# Patient Record
Sex: MALE | Race: WHITE | NOT HISPANIC OR LATINO | Employment: FULL TIME | URBAN - NONMETROPOLITAN AREA
[De-identification: names, ages, dates, MRNs, and addresses within clinical notes are randomized per-mention and may not be internally consistent; named-entity substitution may affect disease eponyms.]

---

## 2017-06-08 ENCOUNTER — TELEPHONE (OUTPATIENT)
Dept: FAMILY MEDICINE CLINIC | Facility: CLINIC | Age: 62
End: 2017-06-08

## 2017-06-13 ENCOUNTER — OFFICE VISIT (OUTPATIENT)
Dept: FAMILY MEDICINE CLINIC | Facility: CLINIC | Age: 62
End: 2017-06-13

## 2017-06-13 VITALS
WEIGHT: 195 LBS | DIASTOLIC BLOOD PRESSURE: 70 MMHG | HEIGHT: 72 IN | SYSTOLIC BLOOD PRESSURE: 120 MMHG | BODY MASS INDEX: 26.41 KG/M2

## 2017-06-13 DIAGNOSIS — I10 ESSENTIAL HYPERTENSION: Primary | Chronic | ICD-10-CM

## 2017-06-13 DIAGNOSIS — E78.01 FAMILIAL HYPERCHOLESTEROLEMIA: Chronic | ICD-10-CM

## 2017-06-13 DIAGNOSIS — Z23 NEED FOR VACCINATION: ICD-10-CM

## 2017-06-13 PROCEDURE — 90471 IMMUNIZATION ADMIN: CPT | Performed by: FAMILY MEDICINE

## 2017-06-13 PROCEDURE — 90715 TDAP VACCINE 7 YRS/> IM: CPT | Performed by: FAMILY MEDICINE

## 2017-06-13 PROCEDURE — 99213 OFFICE O/P EST LOW 20 MIN: CPT | Performed by: FAMILY MEDICINE

## 2017-06-13 NOTE — PROGRESS NOTES
Subjective   Benito Bryson is a 62 y.o. male.     History of Present Illness reevaluation hypertension hyperlipidemia.  In interim as lost 10 pounds.  Remaining active.  His time for tetanus update.  Is up-to-date on all other.   knee for flu vaccines in the fall.  Also counseled summer hydration as well as trauma prevention and sun exposure.    The following portions of the patient's history were reviewed and updated as appropriate: allergies, current medications, past family history, past medical history, past social history, past surgical history and problem list.    Review of Systems   Constitutional: Negative for activity change, appetite change, fatigue and unexpected weight change.   HENT: Negative for trouble swallowing and voice change.    Eyes: Negative for redness and visual disturbance.   Respiratory: Negative for cough and wheezing.    Cardiovascular: Negative for chest pain and palpitations.   Gastrointestinal: Negative for abdominal pain, constipation, diarrhea, nausea and vomiting.   Genitourinary: Negative for urgency.   Musculoskeletal: Negative for joint swelling.   Neurological: Negative for syncope and headaches.   Hematological: Negative for adenopathy.   Psychiatric/Behavioral: Negative for sleep disturbance.       Objective   Physical Exam   Constitutional: He is oriented to person, place, and time. He appears well-developed.   HENT:   Head: Normocephalic.   Nose: Nose normal.   Eyes: Pupils are equal, round, and reactive to light.   Neck: Normal range of motion. No thyromegaly present.   Cardiovascular: Normal rate, regular rhythm, normal heart sounds and intact distal pulses.  Exam reveals no gallop and no friction rub.    No murmur heard.  Pulmonary/Chest: Breath sounds normal.   Abdominal: Soft. He exhibits no distension and no mass. There is no tenderness.   Musculoskeletal: Normal range of motion.   Neurological: He is alert and oriented to person, place, and time. He has  normal reflexes.   Skin: Skin is warm and dry.   Over tanned counseled on same   Psychiatric: He has a normal mood and affect.       Assessment/Plan   Problems Addressed this Visit        Cardiovascular and Mediastinum    Essential hypertension - Primary (Chronic)    Relevant Orders    Comprehensive Metabolic Panel    Magnesium    Hyperlipidemia (Chronic)    Relevant Orders    LDL Cholesterol, Direct      Other Visit Diagnoses     Need for vaccination        Relevant Orders    Tdap Vaccine Greater Than or Equal To 8yo IM           on above.  Lab work next time.   other healthcare issues.  Recheck 6 months.

## 2017-08-09 ENCOUNTER — OFFICE VISIT (OUTPATIENT)
Dept: FAMILY MEDICINE CLINIC | Facility: CLINIC | Age: 62
End: 2017-08-09

## 2017-08-09 VITALS
DIASTOLIC BLOOD PRESSURE: 80 MMHG | SYSTOLIC BLOOD PRESSURE: 118 MMHG | WEIGHT: 195.3 LBS | BODY MASS INDEX: 26.45 KG/M2 | HEIGHT: 72 IN | TEMPERATURE: 100 F

## 2017-08-09 DIAGNOSIS — R19.7 NAUSEA VOMITING AND DIARRHEA: Primary | ICD-10-CM

## 2017-08-09 DIAGNOSIS — R50.9 FEVER, UNSPECIFIED FEVER CAUSE: ICD-10-CM

## 2017-08-09 DIAGNOSIS — R11.2 NAUSEA VOMITING AND DIARRHEA: Primary | ICD-10-CM

## 2017-08-09 PROCEDURE — 99214 OFFICE O/P EST MOD 30 MIN: CPT | Performed by: FAMILY MEDICINE

## 2017-08-09 RX ORDER — PROMETHAZINE HYDROCHLORIDE 25 MG/1
TABLET ORAL
Qty: 15 TABLET | Refills: 1 | Status: SHIPPED | OUTPATIENT
Start: 2017-08-09 | End: 2017-12-21

## 2017-08-09 NOTE — PROGRESS NOTES
Subjective   Benito Bryson is a 62 y.o. male.     History of Present Illness chief complaint of nausea vomiting diarrhea times one started yesterday.  Fever low-grade to the night.  No symptoms other than just mild body aches and queasiness today.  No history of exposure no new foods or tick bites etc.  this had no abdominal surgeries in the past.  current medicines remain the same.    The following portions of the patient's history were reviewed and updated as appropriate: allergies, current medications, past family history, past medical history, past social history, past surgical history and problem list.    Review of Systems   Constitutional: Positive for fever. Negative for activity change, appetite change, fatigue and unexpected weight change.   HENT: Negative for trouble swallowing and voice change.    Eyes: Negative for redness and visual disturbance.   Respiratory: Negative for cough and wheezing.    Cardiovascular: Negative for chest pain and palpitations.   Gastrointestinal: Positive for diarrhea and nausea. Negative for abdominal pain, constipation and vomiting.   Genitourinary: Negative for urgency.   Musculoskeletal: Negative for joint swelling.   Neurological: Negative for syncope and headaches.   Hematological: Negative for adenopathy.   Psychiatric/Behavioral: Negative for sleep disturbance.       Objective   Physical Exam   Constitutional: He appears well-developed.   HENT:   Head: Normocephalic.   Mouth/Throat: Oropharynx is clear and moist.   Eyes: Pupils are equal, round, and reactive to light.   Neck: Normal range of motion. Neck supple.   Cardiovascular: Normal rate, regular rhythm, normal heart sounds and intact distal pulses.    Pulmonary/Chest: Effort normal and breath sounds normal.   Abdominal: Soft. Bowel sounds are normal. He exhibits no distension and no mass. There is no tenderness. There is no rebound and no guarding. No hernia.   Musculoskeletal: Normal range of motion.    Neurological: He is alert.   Skin: Skin is warm and dry. No rash noted.   Psychiatric: He has a normal mood and affect. His behavior is normal.       Assessment/Plan   Problems Addressed this Visit     None      Visit Diagnoses     Nausea vomiting and diarrhea    -  Primary    Relevant Medications    promethazine (PHENERGAN) 25 MG tablet    Fever, unspecified fever cause        Relevant Medications    promethazine (PHENERGAN) 25 MG tablet          Enteritis by history.   early to declare.  Counseled on hydration exposure medication symptomatic relief.   not improved in 12-18 hours is to let us know.  Counseled on disease process.

## 2017-12-17 DIAGNOSIS — I10 ESSENTIAL HYPERTENSION: ICD-10-CM

## 2017-12-18 RX ORDER — LISINOPRIL 10 MG/1
TABLET ORAL
Qty: 90 TABLET | Refills: 3 | Status: SHIPPED | OUTPATIENT
Start: 2017-12-18 | End: 2018-12-17 | Stop reason: SDUPTHER

## 2017-12-21 ENCOUNTER — OFFICE VISIT (OUTPATIENT)
Dept: FAMILY MEDICINE CLINIC | Facility: CLINIC | Age: 62
End: 2017-12-21

## 2017-12-21 ENCOUNTER — LAB (OUTPATIENT)
Dept: LAB | Facility: HOSPITAL | Age: 62
End: 2017-12-21

## 2017-12-21 VITALS
BODY MASS INDEX: 26.95 KG/M2 | WEIGHT: 199 LBS | SYSTOLIC BLOOD PRESSURE: 110 MMHG | HEIGHT: 72 IN | TEMPERATURE: 98.1 F | DIASTOLIC BLOOD PRESSURE: 78 MMHG

## 2017-12-21 DIAGNOSIS — E78.01 FAMILIAL HYPERCHOLESTEROLEMIA: Chronic | ICD-10-CM

## 2017-12-21 DIAGNOSIS — I10 ESSENTIAL HYPERTENSION: Chronic | ICD-10-CM

## 2017-12-21 DIAGNOSIS — J06.9 ACUTE URI: ICD-10-CM

## 2017-12-21 DIAGNOSIS — E78.01 FAMILIAL HYPERCHOLESTEROLEMIA: Primary | Chronic | ICD-10-CM

## 2017-12-21 LAB
ALBUMIN SERPL-MCNC: 4.2 G/DL (ref 3.4–4.8)
ALBUMIN/GLOB SERPL: 1.2 G/DL (ref 1.1–1.8)
ALP SERPL-CCNC: 68 U/L (ref 38–126)
ALT SERPL W P-5'-P-CCNC: 46 U/L (ref 21–72)
ANION GAP SERPL CALCULATED.3IONS-SCNC: 12 MMOL/L (ref 5–15)
ARTICHOKE IGE QN: 106 MG/DL (ref 1–129)
AST SERPL-CCNC: 55 U/L (ref 17–59)
BILIRUB SERPL-MCNC: 0.7 MG/DL (ref 0.2–1.3)
BUN BLD-MCNC: 21 MG/DL (ref 7–21)
BUN/CREAT SERPL: 17.8 (ref 7–25)
CALCIUM SPEC-SCNC: 9.1 MG/DL (ref 8.4–10.2)
CHLORIDE SERPL-SCNC: 103 MMOL/L (ref 95–110)
CO2 SERPL-SCNC: 24 MMOL/L (ref 22–31)
CREAT BLD-MCNC: 1.18 MG/DL (ref 0.7–1.3)
GFR SERPL CREATININE-BSD FRML MDRD: 63 ML/MIN/1.73 (ref 60–113)
GLOBULIN UR ELPH-MCNC: 3.6 GM/DL (ref 2.3–3.5)
GLUCOSE BLD-MCNC: 97 MG/DL (ref 60–100)
MAGNESIUM SERPL-MCNC: 2.1 MG/DL (ref 1.6–2.3)
POTASSIUM BLD-SCNC: 4.3 MMOL/L (ref 3.5–5.1)
PROT SERPL-MCNC: 7.8 G/DL (ref 6.3–8.6)
SODIUM BLD-SCNC: 139 MMOL/L (ref 137–145)

## 2017-12-21 PROCEDURE — 83735 ASSAY OF MAGNESIUM: CPT

## 2017-12-21 PROCEDURE — 99214 OFFICE O/P EST MOD 30 MIN: CPT | Performed by: FAMILY MEDICINE

## 2017-12-21 PROCEDURE — 36415 COLL VENOUS BLD VENIPUNCTURE: CPT

## 2017-12-21 PROCEDURE — 83721 ASSAY OF BLOOD LIPOPROTEIN: CPT

## 2017-12-21 PROCEDURE — 80053 COMPREHEN METABOLIC PANEL: CPT

## 2017-12-21 RX ORDER — SOD CHLOR,SOD BICARB/NETI POT
PACKET, WITH RINSE DEVICE NASAL
Qty: 50 EACH | Refills: 11 | Status: SHIPPED | OUTPATIENT
Start: 2017-12-21 | End: 2018-06-21

## 2017-12-21 RX ORDER — ATORVASTATIN CALCIUM 20 MG/1
20 TABLET, FILM COATED ORAL DAILY
Qty: 90 TABLET | Refills: 4 | Status: SHIPPED | OUTPATIENT
Start: 2017-12-21 | End: 2018-12-20

## 2017-12-21 RX ORDER — ASPIRIN 81 MG/1
81 TABLET ORAL DAILY
Qty: 90 TABLET | Refills: 4 | Status: SHIPPED | OUTPATIENT
Start: 2017-12-21 | End: 2019-02-25 | Stop reason: SDUPTHER

## 2017-12-21 RX ORDER — FLUTICASONE PROPIONATE 50 MCG
2 SPRAY, SUSPENSION (ML) NASAL DAILY
Qty: 1 BOTTLE | Refills: 11 | Status: SHIPPED | OUTPATIENT
Start: 2017-12-21 | End: 2018-06-21

## 2017-12-21 RX ORDER — GUAIFENESIN 600 MG/1
TABLET, EXTENDED RELEASE ORAL
Qty: 30 TABLET | Refills: 1 | Status: SHIPPED | OUTPATIENT
Start: 2017-12-21 | End: 2018-06-21

## 2017-12-21 NOTE — PROGRESS NOTES
Subjective   Benito Bryson is a 62 y.o. male.     History of Present Illness   reevaluation hypertension hyperlipidemia  lab work excellent.  Feels well except for intercurrent URI for the past 2-3 days.  Sinus drainage postnasal drip cough etc.  No fever.  Is to get flu vaccine in the community since we are out.  Is up-to-date on all other.    The following portions of the patient's history were reviewed and updated as appropriate: allergies, current medications, past family history, past medical history, past social history, past surgical history and problem list.    Review of Systems   Constitutional: Negative for activity change, appetite change, fatigue and unexpected weight change.   HENT: Positive for congestion, rhinorrhea and sore throat. Negative for trouble swallowing and voice change.    Eyes: Negative for redness and visual disturbance.   Respiratory: Positive for cough. Negative for wheezing.    Cardiovascular: Negative for chest pain and palpitations.   Gastrointestinal: Negative for abdominal pain, constipation, diarrhea, nausea and vomiting.   Genitourinary: Negative for urgency.   Musculoskeletal: Negative for joint swelling.   Neurological: Negative for syncope and headaches.   Hematological: Negative for adenopathy.   Psychiatric/Behavioral: Negative for sleep disturbance.       Objective   Physical Exam   Constitutional: He is oriented to person, place, and time. He appears well-developed.   HENT:   Head: Normocephalic.   Right Ear: External ear normal.   Nose: Mucosal edema and rhinorrhea present.   Mouth/Throat: Oropharynx is clear and moist.   Eyes: Pupils are equal, round, and reactive to light.   Neck: Normal range of motion. No thyromegaly present.   Cardiovascular: Normal rate, regular rhythm, normal heart sounds and intact distal pulses.  Exam reveals no gallop and no friction rub.    No murmur heard.  Pulmonary/Chest: Breath sounds normal.   Abdominal: Soft. He exhibits no  distension and no mass. There is no tenderness.   Musculoskeletal: Normal range of motion.   Neurological: He is alert and oriented to person, place, and time. He has normal reflexes.   Skin: Skin is warm and dry.   Psychiatric: He has a normal mood and affect.     Results for orders placed or performed in visit on 12/21/17   Comprehensive Metabolic Panel   Result Value Ref Range    Glucose 97 60 - 100 mg/dL    BUN 21 7 - 21 mg/dL    Creatinine 1.18 0.70 - 1.30 mg/dL    Sodium 139 137 - 145 mmol/L    Potassium 4.3 3.5 - 5.1 mmol/L    Chloride 103 95 - 110 mmol/L    CO2 24.0 22.0 - 31.0 mmol/L    Calcium 9.1 8.4 - 10.2 mg/dL    Total Protein 7.8 6.3 - 8.6 g/dL    Albumin 4.20 3.40 - 4.80 g/dL    ALT (SGPT) 46 21 - 72 U/L    AST (SGOT) 55 17 - 59 U/L    Alkaline Phosphatase 68 38 - 126 U/L    Total Bilirubin 0.7 0.2 - 1.3 mg/dL    eGFR Non African Amer 63 >60 mL/min/1.73    Globulin 3.6 (H) 2.3 - 3.5 gm/dL    A/G Ratio 1.2 1.1 - 1.8 g/dL    BUN/Creatinine Ratio 17.8 7.0 - 25.0    Anion Gap 12.0 5.0 - 15.0 mmol/L   Magnesium   Result Value Ref Range    Magnesium 2.1 1.6 - 2.3 mg/dL   LDL Cholesterol, Direct   Result Value Ref Range    LDL Cholesterol  106 1 - 129 mg/dL       Recheck 6 months  Assessment/Plan   Problems Addressed this Visit        Cardiovascular and Mediastinum    Essential hypertension (Chronic)    Relevant Medications    aspirin 81 MG EC tablet    Hyperlipidemia - Primary (Chronic)    Relevant Medications    atorvastatin (LIPITOR) 20 MG tablet      Other Visit Diagnoses     Acute URI        Relevant Medications    fluticasone (FLONASE) 50 MCG/ACT nasal spray    guaiFENesin (MUCINEX) 600 MG 12 hr tablet    Hypertonic Nasal Wash (NASAFLO NETI POT NASAL WASH) pack        on [place] pot use hydration symptomatic relief short-term medicine.  Continue long-term medicines.  Recheck 6 months

## 2018-01-19 DIAGNOSIS — E78.01 FAMILIAL HYPERCHOLESTEROLEMIA: ICD-10-CM

## 2018-01-19 RX ORDER — ATORVASTATIN CALCIUM 20 MG/1
TABLET, FILM COATED ORAL
Qty: 90 TABLET | Refills: 3 | Status: SHIPPED | OUTPATIENT
Start: 2018-01-19 | End: 2018-06-21 | Stop reason: SDUPTHER

## 2018-02-16 DIAGNOSIS — I10 ESSENTIAL HYPERTENSION: ICD-10-CM

## 2018-02-16 RX ORDER — ASPIRIN 81 MG/1
TABLET ORAL
Qty: 90 TABLET | Refills: 2 | Status: SHIPPED | OUTPATIENT
Start: 2018-02-16 | End: 2018-06-21 | Stop reason: SDUPTHER

## 2018-06-21 ENCOUNTER — OFFICE VISIT (OUTPATIENT)
Dept: FAMILY MEDICINE CLINIC | Facility: CLINIC | Age: 63
End: 2018-06-21

## 2018-06-21 VITALS
DIASTOLIC BLOOD PRESSURE: 78 MMHG | HEIGHT: 72 IN | BODY MASS INDEX: 26.36 KG/M2 | SYSTOLIC BLOOD PRESSURE: 120 MMHG | WEIGHT: 194.6 LBS

## 2018-06-21 DIAGNOSIS — I10 ESSENTIAL HYPERTENSION: Primary | Chronic | ICD-10-CM

## 2018-06-21 DIAGNOSIS — E78.01 FAMILIAL HYPERCHOLESTEROLEMIA: Chronic | ICD-10-CM

## 2018-06-21 DIAGNOSIS — L57.8 SUN-DAMAGED SKIN: ICD-10-CM

## 2018-06-21 PROCEDURE — 99214 OFFICE O/P EST MOD 30 MIN: CPT | Performed by: FAMILY MEDICINE

## 2018-06-21 NOTE — PROGRESS NOTES
Subjective   Benito Bryson is a 63 y.o. male.     History of Present Illness   follow-up hypertension hyperlipidemia.  Also some damaged skin.  In interim is developed some biceps tendinitis which is resolving arthritis the left thumb which is stable.  Is up-to-date on all other.  History noted.    The following portions of the patient's history were reviewed and updated as appropriate: allergies, current medications, past family history, past medical history, past social history, past surgical history and problem list.    Review of Systems   Constitutional: Negative for activity change, appetite change, fatigue and unexpected weight change.   HENT: Negative for trouble swallowing and voice change.    Eyes: Negative for redness and visual disturbance.   Respiratory: Negative for cough and wheezing.    Cardiovascular: Negative for chest pain and palpitations.   Gastrointestinal: Negative for abdominal pain, constipation, diarrhea, nausea and vomiting.   Genitourinary: Negative for urgency.   Musculoskeletal: Positive for arthralgias. Negative for joint swelling.   Neurological: Negative for syncope and headaches.   Hematological: Negative for adenopathy.   Psychiatric/Behavioral: Negative for sleep disturbance.       Objective   Physical Exam   Constitutional: He is oriented to person, place, and time. He appears well-developed.   HENT:   Head: Normocephalic.   Nose: Nose normal.   Eyes: Pupils are equal, round, and reactive to light.   Neck: Normal range of motion. No thyromegaly present.   Cardiovascular: Normal rate, regular rhythm, normal heart sounds and intact distal pulses.  Exam reveals no gallop and no friction rub.    No murmur heard.  Pulmonary/Chest: Breath sounds normal.   Abdominal: Soft. He exhibits no distension and no mass. There is no tenderness.   Musculoskeletal: Normal range of motion.   Neurological: He is alert and oriented to person, place, and time. He has normal reflexes.   Skin: Skin  is warm and dry.   Over tanned arms legs head and face.  Couple of early lentigo scalp.  Counseled on same.   Psychiatric: He has a normal mood and affect. His speech is normal and behavior is normal. Cognition and memory are normal.       Assessment/Plan   Benito was seen today for hypertension.    Diagnoses and all orders for this visit:    Essential hypertension    Familial hypercholesterolemia    Sun-damaged skin       on sunblock sun damage etc.  Counseled on exposure.  Counseled on summer hydration medication etc.  Recheck 6 months betime for lab work

## 2018-08-31 PROCEDURE — 87147 CULTURE TYPE IMMUNOLOGIC: CPT | Performed by: NURSE PRACTITIONER

## 2018-08-31 PROCEDURE — 87070 CULTURE OTHR SPECIMN AEROBIC: CPT | Performed by: NURSE PRACTITIONER

## 2018-08-31 PROCEDURE — 87077 CULTURE AEROBIC IDENTIFY: CPT | Performed by: NURSE PRACTITIONER

## 2018-08-31 PROCEDURE — 87205 SMEAR GRAM STAIN: CPT | Performed by: NURSE PRACTITIONER

## 2018-08-31 PROCEDURE — 87186 SC STD MICRODIL/AGAR DIL: CPT | Performed by: NURSE PRACTITIONER

## 2018-10-09 DIAGNOSIS — M25.511 RIGHT SHOULDER PAIN, UNSPECIFIED CHRONICITY: Primary | ICD-10-CM

## 2018-10-10 ENCOUNTER — HOSPITAL ENCOUNTER (OUTPATIENT)
Dept: MRI IMAGING | Facility: HOSPITAL | Age: 63
Discharge: HOME | End: 2018-10-10
Attending: ORTHOPAEDIC SURGERY | Admitting: ORTHOPAEDIC SURGERY

## 2018-10-10 ENCOUNTER — OFFICE VISIT (OUTPATIENT)
Dept: ORTHOPEDIC SURGERY | Facility: CLINIC | Age: 63
End: 2018-10-10

## 2018-10-10 VITALS — HEIGHT: 72 IN | WEIGHT: 195 LBS | BODY MASS INDEX: 26.41 KG/M2

## 2018-10-10 DIAGNOSIS — S46.212A BICEPS TENDON RUPTURE, LEFT, INITIAL ENCOUNTER: Primary | ICD-10-CM

## 2018-10-10 DIAGNOSIS — M75.121 COMPLETE TEAR OF RIGHT ROTATOR CUFF: ICD-10-CM

## 2018-10-10 PROBLEM — M75.01 ADHESIVE CAPSULITIS OF RIGHT SHOULDER: Status: ACTIVE | Noted: 2018-10-10

## 2018-10-10 PROCEDURE — 99203 OFFICE O/P NEW LOW 30 MIN: CPT | Performed by: ORTHOPAEDIC SURGERY

## 2018-10-10 PROCEDURE — 73221 MRI JOINT UPR EXTREM W/O DYE: CPT

## 2018-10-10 NOTE — PROGRESS NOTES
Benito Bryson is a 63 y.o. male   Primary provider:  Gerry Corrales MD       Chief Complaint   Patient presents with   • Right Shoulder - Pain   • Left Upper Arm - Pain       HISTORY OF PRESENT ILLNESS: Patient is here today for right shoulder pain and left bicep tendon pain. Patient states that he does not have his full range of motion in the right shoulder. He is also concerned about his left bicep tendon. Patient states that this past spring he felt like it was torn and he has just taken it easy with his left side but now that his right shoulder is giving him problems his left arm is not as useful.  He fell on his right arm this was past weekend the day after to lift it up.  No previous problem with that arm.    History of Present Illness     CONCURRENT MEDICAL HISTORY:    Past Medical History:   Diagnosis Date   • Astigmatism     hyperopic   • Bradycardia    • Contusion of cerebrum (CMS/HCC)     history of   • Hyperlipidemia    • Hypertensive disorder        No Known Allergies      Current Outpatient Prescriptions:   •  aspirin 81 MG EC tablet, Take 1 tablet by mouth Daily., Disp: 90 tablet, Rfl: 4  •  atorvastatin (LIPITOR) 20 MG tablet, Take 1 tablet by mouth Daily., Disp: 90 tablet, Rfl: 4  •  lisinopril (PRINIVIL,ZESTRIL) 10 MG tablet, TAKE 1 TABLET BY MOUTH DAILY., Disp: 90 tablet, Rfl: 3    Past Surgical History:   Procedure Laterality Date   • COLONOSCOPY  12/20/2012    Colonoscopy, diagnostic (screening) 23451 (1)    Diverticulum in sigmoid colon. Internal & external hemorrhoids found.    • ENDOSCOPY  12/20/2012    EGD w/ tube 50109 (1)    Ulcer in middle 3rd of esophagus. Biopsy taken. Esophagitis seen in distal 3rd. of esophagus. Biopsy taken. Gastritis in stomach. Biopsy taken. Normal duodenum.    • EXCISION LESION  03/27/2014    Destruction of Premalignant Lesion (1st) 76443 (1)      • EXCISION LESION  03/21/2013    Shave Skin Lesions Face-MM 0.6 - 1 CM 34754 (1)      • OTHER SURGICAL HISTORY   "12/05/2012    Avulsion of Nail Plate 74017 (1)   • SKIN BIOPSY  12/19/2011    Biopsy of Skin 85671 (2)      • VASECTOMY  12/28/1995    Vasectomy - ligate (1)    Elective        Family History   Problem Relation Age of Onset   • Diabetes Mother    • Alzheimer's disease Maternal Grandmother    • Osteoarthritis Other    • Vision loss Other         Social History     Social History   • Marital status:      Spouse name: N/A   • Number of children: N/A   • Years of education: N/A     Occupational History   • Not on file.     Social History Main Topics   • Smoking status: Never Smoker   • Smokeless tobacco: Never Used   • Alcohol use Yes      Comment: social   • Drug use: No   • Sexual activity: Not on file     Other Topics Concern   • Not on file     Social History Narrative   • No narrative on file        Review of Systems   Constitutional: Negative.    HENT: Negative.    Eyes: Negative.    Respiratory: Negative.    Cardiovascular: Negative.    Gastrointestinal: Negative.    Endocrine: Negative.    Genitourinary: Negative.    Musculoskeletal: Positive for arthralgias and joint swelling.        Right shoulder pain   Left bicep tendon    Skin: Negative.    Allergic/Immunologic: Negative.    Neurological: Negative.    Hematological: Negative.    Psychiatric/Behavioral: Negative.        PHYSICAL EXAMINATION:       Ht 182.9 cm (72\")   Wt 88.5 kg (195 lb)   BMI 26.45 kg/m²     Physical Exam   Constitutional: He is oriented to person, place, and time. He appears well-developed and well-nourished.   HENT:   Head: Normocephalic and atraumatic.   Eyes: Pupils are equal, round, and reactive to light. EOM are normal.   Neck: Neck supple. No tracheal deviation present.   Pulmonary/Chest: Effort normal.   Musculoskeletal: He exhibits tenderness. He exhibits no edema or deformity.   Neurological: He is alert and oriented to person, place, and time.   Skin: Skin is warm and dry. No erythema.   Psychiatric: He has a normal mood " and affect.   Vitals reviewed.      GAIT:     [x]  Normal  []  Antalgic    Assistive device: [x]  None  []  Walker     []  Crutches  []  Cane     []  Wheelchair  []  Stretcher    Ortho Exam   Passive motion is intact.,  Specific weakness of the infraspinatus without resistance and supraspinatus is also significantly weak.  Positive drop test.  Not tender over the AC joint.  Biceps tendon rupture on the left side.  Strength is intact on the left side.    No results found.    Three-view x-rays are negative    ASSESSMENT:    Diagnoses and all orders for this visit:    Biceps tendon rupture, left, initial encounter    Complete tear of right rotator cuff  -     MRI shoulder right wo contrast; Future          PLAN marked weakness.  I think he has a traumatic rotator cuff tear and needs an MRI scan.  No place for injection or conservative treatment.  He needs MRI scan and likely needs repair (suspect is a fairly large acute traumatic tear discussed this with him and we'll see him back afterwards    No Follow-up on file.    Yoan De Jesus MD

## 2018-10-15 ENCOUNTER — OFFICE VISIT (OUTPATIENT)
Dept: ORTHOPEDIC SURGERY | Facility: CLINIC | Age: 63
End: 2018-10-15

## 2018-10-15 VITALS — WEIGHT: 200 LBS | HEIGHT: 72 IN | BODY MASS INDEX: 27.09 KG/M2

## 2018-10-15 DIAGNOSIS — M75.101 ROTATOR CUFF SYNDROME OF RIGHT SHOULDER: ICD-10-CM

## 2018-10-15 DIAGNOSIS — M25.511 RIGHT SHOULDER PAIN, UNSPECIFIED CHRONICITY: Primary | ICD-10-CM

## 2018-10-15 PROCEDURE — 99213 OFFICE O/P EST LOW 20 MIN: CPT | Performed by: ORTHOPAEDIC SURGERY

## 2018-10-15 NOTE — PROGRESS NOTES
"Benito Bryson is a 63 y.o. male returns for     Chief Complaint   Patient presents with   • Right Shoulder - Follow-up       HISTORY OF PRESENT ILLNESS: Follow up on the right shoulder. MRI was done on 10/10/*2018 at [facility]. Patient is here today for the results.  MRI shows some tendinopathy and a large bone bruising area in the substance of the greater tuberosity and humeral head.  This is probably from impacting a result of his injury.  Clinically he is much better today.       CONCURRENT MEDICAL HISTORY:    The following portions of the patient's history were reviewed and updated as appropriate: allergies, current medications, past family history, past medical history, past social history, past surgical history and problem list.     ROS  No fevers or chills.  No chest pain or shortness of air.  No GI or  disturbances.    PHYSICAL EXAMINATION:       Ht 182.9 cm (72\")   Wt 90.7 kg (200 lb)   BMI 27.12 kg/m²     Physical Exam   Constitutional: He is oriented to person, place, and time. He appears well-developed and well-nourished.   HENT:   Head: Normocephalic and atraumatic.   Eyes: Pupils are equal, round, and reactive to light. EOM are normal.   Neck: Neck supple. No tracheal deviation present.   Pulmonary/Chest: Effort normal.   Musculoskeletal: He exhibits tenderness. He exhibits no edema or deformity.   Neurological: He is alert and oriented to person, place, and time.   Skin: Skin is warm and dry. No erythema.   Psychiatric: He has a normal mood and affect.   Vitals reviewed.      GAIT:     [x]  Normal  []  Antalgic    Assistive device: [x]  None  []  Walker     []  Crutches  []  Cane     []  Wheelchair  []  Stretcher    Ortho Exam  Passive motion is intact not tender over the AC joint certainly has a positive impingement raising arm better abduction and forward flexion still has some pain and weakness here.    Xr Shoulder 2+ View Right    Result Date: 10/11/2018  Narrative: Right shoulder " three view on 10/10/2018 CLINICAL INDICATION: Right shoulder pain COMPARISON: None FINDINGS: The AC joint is well aligned. The glenohumeral joint is well located. There are no fractures. No bony abnormality is noted.     Impression: No acute bony abnormality. Electronically signed by:  Todd Singh  10/11/2018 12:47 AM CDT Workstation: [id number]    Mri Shoulder Right Wo Contrast    Result Date: 10/11/2018  Narrative: MRI right shoulder without contrast on 10/10/2018 CLINICAL INDICATION: Rotator cuff tear, fell on outstretched arm, right shoulder pain and limited range of motion since then TECHNIQUE: Multiplanar, multisequence MR images are obtained throughout the right shoulder without the administration of contrast. This examination was performed on a 1.5 Suha magnet. COMPARISON: Right shoulder x-ray from 10/10/2018 FINDINGS: The AC joint is well aligned. Minimal degenerative changes are noted in the AC joint. The inferior glenohumeral ligament is intact and unremarkable. The labrum appears intact. Biceps tendon is intact and unremarkable. There is edema within the anterior superior lateral humeral head likely representing an area of contusion. There is no linear signal to suggest fracture. Small amount of fluid is noted in the subacromial and subdeltoid bursa. There is mild abnormal signal in the distal supraspinatus tendon consistent with a tendinopathy or mild partial tear. No evidence of a full-thickness or complete rotator cuff tear is noted. There is no tendon retraction and no muscular atrophy or edema. There is mild edema centered around the AC joint that may be degenerative versus additional small areas of contusion. Bone marrow signal is otherwise unremarkable.     Impression: 1. Findings consistent with a mild tendinopathy or minimal partial tear of the distal supraspinatus tendon without evidence of a full-thickness or complete rotator cuff tear. 2. Bone contusion involving the anterior  humeral head and possible also bone contusions around the AC joint. 3. Mild subacromial/subdeltoid bursitis. Electronically signed by:  Todd Singh  10/11/2018 12:57 AM CDT Workstation: [id number]            ASSESSMENT:    Diagnoses and all orders for this visit:    Right shoulder pain, unspecified chronicity    Rotator cuff syndrome of right shoulder          PLAN this point is improved exercises range of motion which we've gone over today.  I suspect this will resolve spontaneously.  I'll check him back in 3 weeks he wants to go back to work on thinking probably go back to work in a week or so he is on by me know if he is not able to consider physical therapy as well.  I've explained that I think probably 3 months to resolve.  If still going on it for 6 weeks consider subacromial injection    No Follow-up on file.    Yoan De Jesus MD

## 2018-10-16 PROBLEM — M75.101 ROTATOR CUFF SYNDROME OF RIGHT SHOULDER: Status: ACTIVE | Noted: 2018-10-16

## 2018-10-16 PROBLEM — M75.121 COMPLETE TEAR OF RIGHT ROTATOR CUFF: Status: RESOLVED | Noted: 2018-10-10 | Resolved: 2018-10-16

## 2018-12-17 DIAGNOSIS — I10 ESSENTIAL HYPERTENSION: ICD-10-CM

## 2018-12-17 RX ORDER — LISINOPRIL 10 MG/1
TABLET ORAL
Qty: 90 TABLET | Refills: 3 | Status: SHIPPED | OUTPATIENT
Start: 2018-12-17 | End: 2019-12-11 | Stop reason: SDUPTHER

## 2018-12-20 ENCOUNTER — APPOINTMENT (OUTPATIENT)
Dept: LAB | Facility: HOSPITAL | Age: 63
End: 2018-12-20

## 2018-12-20 ENCOUNTER — OFFICE VISIT (OUTPATIENT)
Dept: FAMILY MEDICINE CLINIC | Facility: CLINIC | Age: 63
End: 2018-12-20

## 2018-12-20 VITALS
BODY MASS INDEX: 26.93 KG/M2 | WEIGHT: 198.8 LBS | SYSTOLIC BLOOD PRESSURE: 122 MMHG | HEIGHT: 72 IN | DIASTOLIC BLOOD PRESSURE: 78 MMHG

## 2018-12-20 DIAGNOSIS — I10 ESSENTIAL HYPERTENSION: Primary | Chronic | ICD-10-CM

## 2018-12-20 DIAGNOSIS — E78.01 FAMILIAL HYPERCHOLESTEROLEMIA: Chronic | ICD-10-CM

## 2018-12-20 LAB
ALBUMIN SERPL-MCNC: 4.3 G/DL (ref 3.4–4.8)
ALBUMIN/GLOB SERPL: 1.5 G/DL (ref 1.1–1.8)
ALP SERPL-CCNC: 70 U/L (ref 38–126)
ALT SERPL W P-5'-P-CCNC: 24 U/L (ref 21–72)
ANION GAP SERPL CALCULATED.3IONS-SCNC: 10 MMOL/L (ref 5–15)
AST SERPL-CCNC: 46 U/L (ref 17–59)
BILIRUB SERPL-MCNC: 0.5 MG/DL (ref 0.2–1.3)
BUN BLD-MCNC: 19 MG/DL (ref 7–21)
BUN/CREAT SERPL: 18.6 (ref 7–25)
CALCIUM SPEC-SCNC: 8.9 MG/DL (ref 8.4–10.2)
CHLORIDE SERPL-SCNC: 99 MMOL/L (ref 95–110)
CHOLEST SERPL-MCNC: 212 MG/DL (ref 0–199)
CO2 SERPL-SCNC: 26 MMOL/L (ref 22–31)
CREAT BLD-MCNC: 1.02 MG/DL (ref 0.7–1.3)
GFR SERPL CREATININE-BSD FRML MDRD: 74 ML/MIN/1.73 (ref 49–113)
GLOBULIN UR ELPH-MCNC: 2.9 GM/DL (ref 2.3–3.5)
GLUCOSE BLD-MCNC: 134 MG/DL (ref 60–100)
HDLC SERPL-MCNC: 45 MG/DL (ref 60–200)
LDLC SERPL CALC-MCNC: 140 MG/DL (ref 0–129)
LDLC/HDLC SERPL: 3.11 {RATIO} (ref 0–3.55)
MAGNESIUM SERPL-MCNC: 2.4 MG/DL (ref 1.6–2.3)
POTASSIUM BLD-SCNC: 3.6 MMOL/L (ref 3.5–5.1)
PROT SERPL-MCNC: 7.2 G/DL (ref 6.3–8.6)
SODIUM BLD-SCNC: 135 MMOL/L (ref 137–145)
TRIGL SERPL-MCNC: 136 MG/DL (ref 20–199)
VLDLC SERPL-MCNC: 27.2 MG/DL

## 2018-12-20 PROCEDURE — 80061 LIPID PANEL: CPT | Performed by: FAMILY MEDICINE

## 2018-12-20 PROCEDURE — 36415 COLL VENOUS BLD VENIPUNCTURE: CPT | Performed by: FAMILY MEDICINE

## 2018-12-20 PROCEDURE — 80053 COMPREHEN METABOLIC PANEL: CPT | Performed by: FAMILY MEDICINE

## 2018-12-20 PROCEDURE — 83735 ASSAY OF MAGNESIUM: CPT | Performed by: FAMILY MEDICINE

## 2018-12-20 PROCEDURE — 99213 OFFICE O/P EST LOW 20 MIN: CPT | Performed by: FAMILY MEDICINE

## 2018-12-20 NOTE — PROGRESS NOTES
Subjective   Benito Bryson is a 63 y.o. male.     History of Present Illness   reevaluation hypertension hyperlipidemia tobacco some mild myalgias over the past month or 2 stopped his cholesterol medicine myalgias Better recheck her numbers today and see where they are.  Blood pressures holding weights holdings up-to-date on all his standard screenings.  Declines flu vaccine.    The following portions of the patient's history were reviewed and updated as appropriate: allergies, current medications, past family history, past medical history, past social history, past surgical history and problem list.    Review of Systems   Constitutional: Negative for activity change, appetite change, fatigue and unexpected weight change.   HENT: Negative for trouble swallowing and voice change.    Eyes: Negative for redness and visual disturbance.   Respiratory: Negative for cough and wheezing.    Cardiovascular: Negative for chest pain and palpitations.   Gastrointestinal: Negative for abdominal pain, constipation, diarrhea, nausea and vomiting.   Genitourinary: Negative for urgency.   Musculoskeletal: Negative for joint swelling.   Neurological: Negative for syncope and headaches.   Hematological: Negative for adenopathy.   Psychiatric/Behavioral: Negative for sleep disturbance.       Objective   Physical Exam   Constitutional: He is oriented to person, place, and time. He appears well-developed.   HENT:   Head: Normocephalic.   Nose: Nose normal.   Eyes: Pupils are equal, round, and reactive to light.   Neck: Normal range of motion. No thyromegaly present.   Cardiovascular: Normal rate, regular rhythm, normal heart sounds and intact distal pulses. Exam reveals no gallop and no friction rub.   No murmur heard.  Pulmonary/Chest: Breath sounds normal.   Abdominal: Soft. He exhibits no distension and no mass. There is no tenderness.   Musculoskeletal: Normal range of motion.   Neurological: He is alert and oriented to person,  place, and time. He has normal reflexes.   Skin: Skin is warm and dry.   Psychiatric: He has a normal mood and affect.       Assessment/Plan   Benito was seen today for hypertension.    Diagnoses and all orders for this visit:    Essential hypertension  -     Comprehensive Metabolic Panel  -     Magnesium    Familial hypercholesterolemia  -     Lipid Panel With LDL / HDL Ratio      Counseled on lifestyle measures repeat lab work today.   on one time hydration follow-up 6 months

## 2019-01-18 ENCOUNTER — PROCEDURE VISIT (OUTPATIENT)
Dept: SURGERY | Facility: CLINIC | Age: 64
End: 2019-01-18

## 2019-01-18 VITALS
WEIGHT: 198.4 LBS | HEART RATE: 60 BPM | BODY MASS INDEX: 26.87 KG/M2 | HEIGHT: 72 IN | SYSTOLIC BLOOD PRESSURE: 120 MMHG | TEMPERATURE: 98 F | DIASTOLIC BLOOD PRESSURE: 80 MMHG

## 2019-01-18 DIAGNOSIS — C44.309 SKIN CANCER OF FOREHEAD: Primary | ICD-10-CM

## 2019-01-18 DIAGNOSIS — C44.320 SQUAMOUS CELL SKIN CANCER, FACE: Primary | ICD-10-CM

## 2019-01-18 PROCEDURE — 88305 TISSUE EXAM BY PATHOLOGIST: CPT | Performed by: SURGERY

## 2019-01-18 PROCEDURE — 88305 TISSUE EXAM BY PATHOLOGIST: CPT | Performed by: PATHOLOGY

## 2019-01-18 PROCEDURE — 11643 EXC F/E/E/N/L MAL+MRG 2.1-3: CPT | Performed by: SURGERY

## 2019-01-18 NOTE — PATIENT INSTRUCTIONS

## 2019-01-20 PROBLEM — C44.320 SQUAMOUS CELL SKIN CANCER, FACE: Status: ACTIVE | Noted: 2019-01-20

## 2019-01-20 NOTE — PROGRESS NOTES
Chief Complaint   Patient presents with   • Skin Lesion     Lesion on forehead        HPI  8 mm biopsy proven skin cancer of the left side of the forehead. Here for excision.  Past Medical History:   Diagnosis Date   • Astigmatism     hyperopic   • Bradycardia    • Contusion of cerebrum (CMS/HCC)     history of   • Hyperlipidemia    • Hypertensive disorder        Past Surgical History:   Procedure Laterality Date   • COLONOSCOPY  12/20/2012    Colonoscopy, diagnostic (screening) 57354 (1)    Diverticulum in sigmoid colon. Internal & external hemorrhoids found.    • ENDOSCOPY  12/20/2012    EGD w/ tube 75397 (1)    Ulcer in middle 3rd of esophagus. Biopsy taken. Esophagitis seen in distal 3rd. of esophagus. Biopsy taken. Gastritis in stomach. Biopsy taken. Normal duodenum.    • EXCISION LESION  03/27/2014    Destruction of Premalignant Lesion (1st) 05889 (1)      • EXCISION LESION  03/21/2013    Shave Skin Lesions Face-MM 0.6 - 1 CM 11569 (1)      • OTHER SURGICAL HISTORY  12/05/2012    Avulsion of Nail Plate 00312 (1)   • SKIN BIOPSY  12/19/2011    Biopsy of Skin 05179 (2)      • VASECTOMY  12/28/1995    Vasectomy - ligate (1)    Elective          Current Outpatient Medications:   •  aspirin 81 MG EC tablet, Take 1 tablet by mouth Daily., Disp: 90 tablet, Rfl: 4  •  lisinopril (PRINIVIL,ZESTRIL) 10 MG tablet, TAKE 1 TABLET BY MOUTH DAILY., Disp: 90 tablet, Rfl: 3    Allergies   Allergen Reactions   • Statins Myalgia       Family History   Problem Relation Age of Onset   • Diabetes Mother    • Alzheimer's disease Maternal Grandmother    • Osteoarthritis Other    • Vision loss Other        Social History     Socioeconomic History   • Marital status:      Spouse name: Not on file   • Number of children: Not on file   • Years of education: Not on file   • Highest education level: Not on file   Social Needs   • Financial resource strain: Not on file   • Food insecurity - worry: Not on file   • Food insecurity -  inability: Not on file   • Transportation needs - medical: Not on file   • Transportation needs - non-medical: Not on file   Occupational History   • Not on file   Tobacco Use   • Smoking status: Never Smoker   • Smokeless tobacco: Never Used   Substance and Sexual Activity   • Alcohol use: Yes     Comment: social   • Drug use: No   • Sexual activity: Not on file   Other Topics Concern   • Not on file   Social History Narrative   • Not on file       Physical Exam  8 mm SCC of the left forehead    Procedure    Pre-op dx: SCC of the forehead- 8 mm  Post-op dx: Same  Procedure: Excision 2.4 mm in length  Surgeon: Dusty  Assistant: None  EBL: 5 cc  Findings: None  Complications: None  Drains:None  Anesthesia: 10 cc 1% xylocaine with epinephrine  Indications: See above  Informed consent; Risks of bleeding, infection, poor wound healing, open wounds, risk of local anesthesia are explained  Procedure: The patient is brought to the OR and place supine on the operating table. The area is prepped with betadine and locally anesthetized. An incision is made with a 15 blade knife and the lesion is excised. Wound is closed with interrrupted 4-0 nylon. Appropriate dressings are applied. Tolerated well.      ASSESSMENT    Benito was seen today for skin lesion.    Diagnoses and all orders for this visit:    Squamous cell skin cancer, face        PLAN    1. Recheck in 6 days for suture removal              This document has been electronically signed by Tay Montano MD on January 20, 2019 5:11 PM

## 2019-01-24 ENCOUNTER — OFFICE VISIT (OUTPATIENT)
Dept: SURGERY | Facility: CLINIC | Age: 64
End: 2019-01-24

## 2019-01-24 VITALS
HEIGHT: 72 IN | TEMPERATURE: 98.9 F | HEART RATE: 77 BPM | WEIGHT: 196.6 LBS | SYSTOLIC BLOOD PRESSURE: 142 MMHG | DIASTOLIC BLOOD PRESSURE: 74 MMHG | BODY MASS INDEX: 26.63 KG/M2

## 2019-01-24 DIAGNOSIS — C44.320 SQUAMOUS CELL SKIN CANCER, FACE: Primary | ICD-10-CM

## 2019-01-24 PROCEDURE — 99024 POSTOP FOLLOW-UP VISIT: CPT | Performed by: SURGERY

## 2019-01-24 NOTE — PATIENT INSTRUCTIONS

## 2019-01-24 NOTE — PROGRESS NOTES
CHIEF COMPLAINT:   Chief Complaint   Patient presents with   • Follow-up     Recheck lesion forehead.       HPI: This patient presents for a post-operative visit after undergoing excision of skin lesions. Doing well- no cellulitis, wound separation, or significant pain.    PATHOLOGY:   Tissue Pathology Exam: [id number]   Order: [id number]   Collected: 1/18/2019 16:37   Status: Final result   Visible to patient: No (Not Released)   Dx: Skin cancer of forehead   Component      Final Diagnosis   SKIN, FOREHEAD, RE-EXCISION:   HEALING BIOPSY SITE.   NEGATIVE FOR RESIDUAL CARCINOMA.    Electronically signed by Mustapha Damon MD on 1/25/2019 at 0929     Physical Exam  Healed incisions- sutures removed  ASSESSMENT:    Benito was seen today for follow-up.    Diagnoses and all orders for this visit:    Squamous cell skin cancer, face        PLAN:    1. The patient will follow-up as needed  2. May shower.   3. May return to normal activity without restrictions.

## 2019-01-25 LAB
LAB AP CASE REPORT: NORMAL
PATH REPORT.FINAL DX SPEC: NORMAL
PATH REPORT.GROSS SPEC: NORMAL

## 2019-02-25 DIAGNOSIS — I10 ESSENTIAL HYPERTENSION: Chronic | ICD-10-CM

## 2019-02-25 RX ORDER — ASPIRIN 81 MG/1
TABLET ORAL
Qty: 90 TABLET | Refills: 0 | Status: SHIPPED | OUTPATIENT
Start: 2019-02-25 | End: 2019-05-23 | Stop reason: SDUPTHER

## 2019-02-27 ENCOUNTER — APPOINTMENT (OUTPATIENT)
Dept: LAB | Facility: HOSPITAL | Age: 64
End: 2019-02-27

## 2019-02-27 ENCOUNTER — OFFICE VISIT (OUTPATIENT)
Dept: FAMILY MEDICINE CLINIC | Facility: CLINIC | Age: 64
End: 2019-02-27

## 2019-02-27 ENCOUNTER — RESULTS ENCOUNTER (OUTPATIENT)
Dept: FAMILY MEDICINE CLINIC | Facility: CLINIC | Age: 64
End: 2019-02-27

## 2019-02-27 VITALS
DIASTOLIC BLOOD PRESSURE: 78 MMHG | SYSTOLIC BLOOD PRESSURE: 110 MMHG | HEIGHT: 72 IN | WEIGHT: 198 LBS | BODY MASS INDEX: 26.82 KG/M2

## 2019-02-27 DIAGNOSIS — Z12.5 SCREENING FOR MALIGNANT NEOPLASM OF PROSTATE: ICD-10-CM

## 2019-02-27 DIAGNOSIS — E78.01 FAMILIAL HYPERCHOLESTEROLEMIA: Chronic | ICD-10-CM

## 2019-02-27 DIAGNOSIS — Z00.00 PREVENTATIVE HEALTH CARE: Primary | ICD-10-CM

## 2019-02-27 DIAGNOSIS — Z12.11 SCREEN FOR COLON CANCER: ICD-10-CM

## 2019-02-27 PROBLEM — M25.511 RIGHT SHOULDER PAIN: Status: RESOLVED | Noted: 2018-10-15 | Resolved: 2019-02-27

## 2019-02-27 LAB
CHOLEST SERPL-MCNC: 182 MG/DL (ref 0–199)
HDLC SERPL-MCNC: 49 MG/DL (ref 60–200)
LDLC SERPL CALC-MCNC: 113 MG/DL (ref 0–129)
LDLC/HDLC SERPL: 2.31 {RATIO} (ref 0–3.55)
PSA SERPL-MCNC: 1.04 NG/ML (ref 0–4)
TRIGL SERPL-MCNC: 99 MG/DL (ref 20–199)
VLDLC SERPL-MCNC: 19.8 MG/DL

## 2019-02-27 PROCEDURE — 80061 LIPID PANEL: CPT | Performed by: FAMILY MEDICINE

## 2019-02-27 PROCEDURE — G0103 PSA SCREENING: HCPCS | Performed by: FAMILY MEDICINE

## 2019-02-27 PROCEDURE — 99396 PREV VISIT EST AGE 40-64: CPT | Performed by: FAMILY MEDICINE

## 2019-02-27 PROCEDURE — 36415 COLL VENOUS BLD VENIPUNCTURE: CPT | Performed by: FAMILY MEDICINE

## 2019-02-27 RX ORDER — LOSARTAN POTASSIUM 25 MG/1
25 TABLET ORAL DAILY
COMMUNITY
End: 2019-02-27

## 2019-02-27 RX ORDER — ROSUVASTATIN CALCIUM 10 MG/1
5 TABLET, COATED ORAL DAILY
COMMUNITY
End: 2019-12-20 | Stop reason: SDUPTHER

## 2019-02-27 NOTE — PROGRESS NOTES
Subjective   Benito Bryson is a 63 y.o. male.     History of Present Illness requesting wellness exam.  Is been seen recently for his routine issues including hyperlipidemia and high blood pressure.  Started back on the cholesterol medicine at half dose as mentioned we need to repeat that today.  Is concerned because father recently has  of colon cancer in his 80s.  Has had a colonoscopy .  Have agreed to do cologuard in the interim.  Discussed pros and cons of PSA.  Rest of lab work has been unremarkable.  Is not due for anything else at this time.    The following portions of the patient's history were reviewed and updated as appropriate: allergies, current medications, past family history, past medical history, past social history, past surgical history and problem list.    Review of Systems   Constitutional: Negative for activity change, appetite change, fatigue and unexpected weight change.   HENT: Negative for trouble swallowing and voice change.    Eyes: Negative for redness and visual disturbance.   Respiratory: Negative for cough and wheezing.    Cardiovascular: Negative for chest pain and palpitations.   Gastrointestinal: Negative for abdominal pain, constipation, diarrhea, nausea and vomiting.   Genitourinary: Negative for urgency.   Musculoskeletal: Negative for joint swelling.   Neurological: Negative for syncope and headaches.   Hematological: Negative for adenopathy.   Psychiatric/Behavioral: Negative for sleep disturbance.       Objective   Physical Exam   Constitutional: He appears well-developed.   HENT:   Head: Normocephalic.   Eyes: Pupils are equal, round, and reactive to light.   Neck: Normal range of motion.   Cardiovascular: Normal rate.   Pulmonary/Chest: Effort normal.   Psychiatric: He has a normal mood and affect. His behavior is normal. Judgment and thought content normal.       Assessment/Plan   Benito was seen today for annual exam.    Diagnoses and all orders for this  visit:    Preventative health care  -     PSA Screen    Familial hypercholesterolemia  -     Lipid Panel With LDL / HDL Ratio    Screen for colon cancer    Screening for malignant neoplasm of prostate  -     Cologuard - Stool, Per Rectum; Future  -     PSA Screen          lifestyle measures orders as above recheck as directed

## 2019-05-23 DIAGNOSIS — I10 ESSENTIAL HYPERTENSION: Chronic | ICD-10-CM

## 2019-05-23 RX ORDER — ASPIRIN 81 MG/1
TABLET, COATED ORAL
Qty: 90 TABLET | Refills: 0 | Status: SHIPPED | OUTPATIENT
Start: 2019-05-23 | End: 2019-08-15 | Stop reason: SDUPTHER

## 2019-06-20 ENCOUNTER — OFFICE VISIT (OUTPATIENT)
Dept: FAMILY MEDICINE CLINIC | Facility: CLINIC | Age: 64
End: 2019-06-20

## 2019-06-20 VITALS
SYSTOLIC BLOOD PRESSURE: 104 MMHG | HEIGHT: 72 IN | BODY MASS INDEX: 27.01 KG/M2 | WEIGHT: 199.4 LBS | DIASTOLIC BLOOD PRESSURE: 78 MMHG

## 2019-06-20 DIAGNOSIS — R73.9 HYPERGLYCEMIA: ICD-10-CM

## 2019-06-20 DIAGNOSIS — L60.3 DYSTROPHIC NAIL: ICD-10-CM

## 2019-06-20 DIAGNOSIS — L57.8 SUN-DAMAGED SKIN: Chronic | ICD-10-CM

## 2019-06-20 DIAGNOSIS — I10 ESSENTIAL HYPERTENSION: Primary | Chronic | ICD-10-CM

## 2019-06-20 DIAGNOSIS — E78.01 FAMILIAL HYPERCHOLESTEROLEMIA: Chronic | ICD-10-CM

## 2019-06-20 PROCEDURE — 99214 OFFICE O/P EST MOD 30 MIN: CPT | Performed by: FAMILY MEDICINE

## 2019-06-20 NOTE — PROGRESS NOTES
Subjective   Benito Bryson is a 64 y.o. male.     History of Present Illness   follow-up hypertension hyperlipidemia sun damage skin.  In the interim is able to take Crestor low dose.  Cholesterol is improved.  Time for repeat 6 months.  Blood pressure holding on medication.  Working a great deal outdoors now.  Counseled on sun exposure sunblock hydration etc.  Has a dystrophic nail left great toe that can be treated locally at home and will probably come off on its own.    The following portions of the patient's history were reviewed and updated as appropriate: allergies, current medications, past family history, past medical history, past social history, past surgical history and problem list.    Review of Systems   Constitutional: Negative for activity change, appetite change, fatigue and unexpected weight change.   HENT: Negative for trouble swallowing and voice change.    Eyes: Negative for redness and visual disturbance.   Respiratory: Negative for cough and wheezing.    Cardiovascular: Negative for chest pain and palpitations.   Gastrointestinal: Negative for abdominal pain, constipation, diarrhea, nausea and vomiting.   Genitourinary: Negative for urgency.   Musculoskeletal: Negative for joint swelling.   Neurological: Negative for syncope and headaches.   Hematological: Negative for adenopathy.   Psychiatric/Behavioral: Negative for sleep disturbance.       Objective   Physical Exam   Constitutional: He is oriented to person, place, and time. He appears well-developed.   HENT:   Head: Normocephalic.   Nose: Nose normal.   Eyes: Pupils are equal, round, and reactive to light.   Neck: Normal range of motion. No thyromegaly present.   Cardiovascular: Normal rate, regular rhythm, normal heart sounds and intact distal pulses. Exam reveals no gallop and no friction rub.   No murmur heard.  Pulmonary/Chest: Breath sounds normal.   Abdominal: Soft. He exhibits no distension and no mass. There is no  tenderness.   Musculoskeletal: Normal range of motion.   Neurological: He is alert and oriented to person, place, and time. He has normal reflexes.   Skin: Skin is warm and dry.   Sun damaged skin dorsum hands arms and legs over the darkened.  No new lesions are seen.  Has had a squamous cell removed of the face.  Left great toenail shows marked dystrophy hyperkeratosis loose on the distal and can be treated at home which should be removed on its own spontaneously.   Psychiatric: He has a normal mood and affect. His speech is normal and behavior is normal.       Assessment/Plan   Benito was seen today for follow-up.    Diagnoses and all orders for this visit:    Essential hypertension  -     Comprehensive Metabolic Panel; Future  -     Magnesium; Future    Familial hypercholesterolemia  -     Lipid Panel With LDL / HDL Ratio; Future    Hyperglycemia  -     Hemoglobin A1c; Future    Sun-damaged skin    Dystrophic nail      For the nail counseled on treatment and removal at home with time.  Counseled on summertime hydration  sunblock Sun exposure etc.  Continue medication recheck 6 months with lab prior.  Does have an elevated sugar 1 time repeat A1c at that time.

## 2019-07-03 ENCOUNTER — TELEPHONE (OUTPATIENT)
Dept: FAMILY MEDICINE CLINIC | Facility: CLINIC | Age: 64
End: 2019-07-03

## 2019-08-15 DIAGNOSIS — I10 ESSENTIAL HYPERTENSION: Chronic | ICD-10-CM

## 2019-08-15 RX ORDER — ASPIRIN 81 MG/1
TABLET ORAL
Qty: 90 TABLET | Refills: 3 | Status: SHIPPED | OUTPATIENT
Start: 2019-08-15 | End: 2020-08-26

## 2019-12-11 DIAGNOSIS — I10 ESSENTIAL HYPERTENSION: ICD-10-CM

## 2019-12-11 RX ORDER — LISINOPRIL 10 MG/1
TABLET ORAL
Qty: 90 TABLET | Refills: 3 | Status: SHIPPED | OUTPATIENT
Start: 2019-12-11 | End: 2020-12-17

## 2019-12-19 ENCOUNTER — LAB (OUTPATIENT)
Dept: LAB | Facility: HOSPITAL | Age: 64
End: 2019-12-19

## 2019-12-19 DIAGNOSIS — I10 ESSENTIAL HYPERTENSION: Chronic | ICD-10-CM

## 2019-12-19 DIAGNOSIS — R73.9 HYPERGLYCEMIA: ICD-10-CM

## 2019-12-19 DIAGNOSIS — E78.01 FAMILIAL HYPERCHOLESTEROLEMIA: Chronic | ICD-10-CM

## 2019-12-19 LAB
ALBUMIN SERPL-MCNC: 4.2 G/DL (ref 3.5–5.2)
ALBUMIN/GLOB SERPL: 1.3 G/DL
ALP SERPL-CCNC: 66 U/L (ref 39–117)
ALT SERPL W P-5'-P-CCNC: 26 U/L (ref 1–41)
ANION GAP SERPL CALCULATED.3IONS-SCNC: 12 MMOL/L (ref 5–15)
AST SERPL-CCNC: 22 U/L (ref 1–40)
BILIRUB SERPL-MCNC: 0.4 MG/DL (ref 0.2–1.2)
BUN BLD-MCNC: 15 MG/DL (ref 8–23)
BUN/CREAT SERPL: 12.7 (ref 7–25)
CALCIUM SPEC-SCNC: 9.4 MG/DL (ref 8.6–10.5)
CHLORIDE SERPL-SCNC: 103 MMOL/L (ref 98–107)
CHOLEST SERPL-MCNC: 164 MG/DL (ref 0–200)
CO2 SERPL-SCNC: 26 MMOL/L (ref 22–29)
CREAT BLD-MCNC: 1.18 MG/DL (ref 0.76–1.27)
GFR SERPL CREATININE-BSD FRML MDRD: 62 ML/MIN/1.73
GLOBULIN UR ELPH-MCNC: 3.3 GM/DL
GLUCOSE BLD-MCNC: 106 MG/DL (ref 65–99)
HBA1C MFR BLD: 5.75 % (ref 4.8–5.6)
HDLC SERPL-MCNC: 45 MG/DL (ref 40–60)
LDLC SERPL CALC-MCNC: 93 MG/DL (ref 0–100)
LDLC/HDLC SERPL: 2.08 {RATIO}
MAGNESIUM SERPL-MCNC: 2.1 MG/DL (ref 1.6–2.4)
POTASSIUM BLD-SCNC: 4.7 MMOL/L (ref 3.5–5.2)
PROT SERPL-MCNC: 7.5 G/DL (ref 6–8.5)
SODIUM BLD-SCNC: 141 MMOL/L (ref 136–145)
TRIGL SERPL-MCNC: 128 MG/DL (ref 0–150)
VLDLC SERPL-MCNC: 25.6 MG/DL (ref 5–40)

## 2019-12-19 PROCEDURE — 36415 COLL VENOUS BLD VENIPUNCTURE: CPT

## 2019-12-19 PROCEDURE — 83735 ASSAY OF MAGNESIUM: CPT

## 2019-12-19 PROCEDURE — 80061 LIPID PANEL: CPT

## 2019-12-19 PROCEDURE — 83036 HEMOGLOBIN GLYCOSYLATED A1C: CPT

## 2019-12-19 PROCEDURE — 80053 COMPREHEN METABOLIC PANEL: CPT

## 2019-12-20 ENCOUNTER — OFFICE VISIT (OUTPATIENT)
Dept: FAMILY MEDICINE CLINIC | Facility: CLINIC | Age: 64
End: 2019-12-20

## 2019-12-20 VITALS
SYSTOLIC BLOOD PRESSURE: 118 MMHG | DIASTOLIC BLOOD PRESSURE: 74 MMHG | HEIGHT: 72 IN | BODY MASS INDEX: 27.22 KG/M2 | WEIGHT: 201 LBS

## 2019-12-20 DIAGNOSIS — E78.01 FAMILIAL HYPERCHOLESTEROLEMIA: Chronic | ICD-10-CM

## 2019-12-20 DIAGNOSIS — L98.9 BENIGN SKIN LESION OF THIGH: ICD-10-CM

## 2019-12-20 DIAGNOSIS — L82.1 SEBORRHEIC KERATOSES: ICD-10-CM

## 2019-12-20 DIAGNOSIS — I10 ESSENTIAL HYPERTENSION: Primary | Chronic | ICD-10-CM

## 2019-12-20 PROCEDURE — 99213 OFFICE O/P EST LOW 20 MIN: CPT | Performed by: FAMILY MEDICINE

## 2019-12-20 PROCEDURE — 17000 DESTRUCT PREMALG LESION: CPT | Performed by: FAMILY MEDICINE

## 2019-12-20 RX ORDER — ROSUVASTATIN CALCIUM 10 MG/1
5 TABLET, COATED ORAL NIGHTLY
Qty: 90 TABLET | Refills: 3 | Status: SHIPPED | OUTPATIENT
Start: 2019-12-20 | End: 2019-12-30

## 2019-12-20 NOTE — PROGRESS NOTES
Subjective   Benito Bryson is a 64 y.o. male.     History of Present Illness   follow-up hypertension hyperlipidemia.  In the interim lab work within normal limits some family history diabetes sugar within normal limits.  Had a colon in the last 10 years declines vaccine.  Has a lesion on the leg wants us to look at and have frozen.  History noted.  HPI    The following portions of the patient's history were reviewed and updated as appropriate: allergies, current medications, past family history, past medical history, past social history, past surgical history and problem list.    Review of Systems  Review of Systems   Constitutional: Negative for activity change, appetite change, fatigue and unexpected weight change.   HENT: Negative for trouble swallowing and voice change.    Eyes: Negative for redness and visual disturbance.   Respiratory: Negative for cough and wheezing.    Cardiovascular: Negative for chest pain and palpitations.   Gastrointestinal: Negative for abdominal pain, constipation, diarrhea, nausea and vomiting.   Genitourinary: Negative for urgency.   Musculoskeletal: Negative for joint swelling.   Skin: Positive for color change.   Neurological: Negative for syncope and headaches.   Hematological: Negative for adenopathy.   Psychiatric/Behavioral: Negative for sleep disturbance.       Objective   Physical Exam  Physical Exam   Constitutional: He is oriented to person, place, and time. He appears well-developed.   HENT:   Head: Normocephalic.   Right Ear: External ear normal.   Nose: Nose normal.   Mouth/Throat: Oropharynx is clear and moist.   Eyes: Pupils are equal, round, and reactive to light.   Neck: Normal range of motion. No thyromegaly present.   Cardiovascular: Normal rate, regular rhythm, normal heart sounds and intact distal pulses. Exam reveals no gallop and no friction rub.   No murmur heard.  Pulmonary/Chest: Breath sounds normal.   Abdominal: Soft. He exhibits no distension and  "no mass. There is no tenderness.   Musculoskeletal: Normal range of motion.   Neurological: He is alert and oriented to person, place, and time. He has normal reflexes.   Skin: Skin is warm and dry.   Left anterior thigh shows a raised seborrheic keratoses about half a centimeter slightly oxidized oil but freely mobile.  Is slightly irritated does rub on the pants.  No other lesions seen   Psychiatric: He has a normal mood and affect.     Results for orders placed or performed in visit on 12/19/19   Comprehensive Metabolic Panel   Result Value Ref Range    Glucose 106 (H) 65 - 99 mg/dL    BUN 15 8 - 23 mg/dL    Creatinine 1.18 0.76 - 1.27 mg/dL    Sodium 141 136 - 145 mmol/L    Potassium 4.7 3.5 - 5.2 mmol/L    Chloride 103 98 - 107 mmol/L    CO2 26.0 22.0 - 29.0 mmol/L    Calcium 9.4 8.6 - 10.5 mg/dL    Total Protein 7.5 6.0 - 8.5 g/dL    Albumin 4.20 3.50 - 5.20 g/dL    ALT (SGPT) 26 1 - 41 U/L    AST (SGOT) 22 1 - 40 U/L    Alkaline Phosphatase 66 39 - 117 U/L    Total Bilirubin 0.4 0.2 - 1.2 mg/dL    eGFR Non African Amer 62 >60 mL/min/1.73    Globulin 3.3 gm/dL    A/G Ratio 1.3 g/dL    BUN/Creatinine Ratio 12.7 7.0 - 25.0    Anion Gap 12.0 5.0 - 15.0 mmol/L   Lipid Panel With LDL / HDL Ratio   Result Value Ref Range    Total Cholesterol 164 0 - 200 mg/dL    Triglycerides 128 0 - 150 mg/dL    HDL Cholesterol 45 40 - 60 mg/dL    LDL Cholesterol  93 0 - 100 mg/dL    VLDL Cholesterol 25.6 5 - 40 mg/dL    LDL/HDL Ratio 2.08    Magnesium   Result Value Ref Range    Magnesium 2.1 1.6 - 2.4 mg/dL   Hemoglobin A1c   Result Value Ref Range    Hemoglobin A1C 5.75 (H) 4.80 - 5.60 %             Visit Vitals  /74   Ht 182.9 cm (72\")   Wt 91.2 kg (201 lb)   BMI 27.26 kg/m²     Body mass index is 27.26 kg/m².      Assessment/Plan   Benito was seen today for hypertension.    Diagnoses and all orders for this visit:    Essential hypertension    Familial hypercholesterolemia  -     rosuvastatin (CRESTOR) 10 MG tablet; Take " 0.5 tablets by mouth Every Night.    BMI 27.0-27.9,adult    Benign skin lesion of thigh    Seborrheic keratoses    Counseled on lifestyle measures diet exercise etc.  For the skin lesion after informed consent areas frozen liquid nitrogen times 2 x 30 seconds counseled wound care instructions watch for infection otherwise recheck 6 months

## 2019-12-30 ENCOUNTER — TELEPHONE (OUTPATIENT)
Dept: FAMILY MEDICINE CLINIC | Facility: CLINIC | Age: 64
End: 2019-12-30

## 2019-12-30 DIAGNOSIS — E78.01 FAMILIAL HYPERCHOLESTEROLEMIA: Chronic | ICD-10-CM

## 2019-12-30 RX ORDER — ROSUVASTATIN CALCIUM 10 MG/1
10 TABLET, COATED ORAL NIGHTLY
Qty: 90 TABLET | Refills: 3 | Status: SHIPPED | OUTPATIENT
Start: 2019-12-30 | End: 2021-01-08

## 2019-12-30 NOTE — TELEPHONE ENCOUNTER
PT WAS CHECKING TO MAKE SURE HIS MED CHANGE WAS CORRECT. HE STATES HE DIDN'T REMEMBER DR BOWEN CHANGING THE RX UNTIL HE WENT TO  FROM PHARMACY. HE STATES HE WAS TAKING ATORVASTATIN 20 MG AND NOW HE IS ON CRESTOR 10 MG, HE NEEDS TO MAKE SURE THIS IS CORRECT. PLEASE CALL HIM [phone]

## 2020-02-27 ENCOUNTER — OFFICE VISIT (OUTPATIENT)
Dept: FAMILY MEDICINE CLINIC | Facility: CLINIC | Age: 65
End: 2020-02-27

## 2020-02-27 VITALS
SYSTOLIC BLOOD PRESSURE: 105 MMHG | WEIGHT: 201 LBS | HEIGHT: 72 IN | DIASTOLIC BLOOD PRESSURE: 72 MMHG | BODY MASS INDEX: 27.22 KG/M2

## 2020-02-27 DIAGNOSIS — E78.01 FAMILIAL HYPERCHOLESTEROLEMIA: Chronic | ICD-10-CM

## 2020-02-27 DIAGNOSIS — M54.50 ACUTE RIGHT-SIDED LOW BACK PAIN WITHOUT SCIATICA: Primary | ICD-10-CM

## 2020-02-27 PROBLEM — M75.01 ADHESIVE CAPSULITIS OF RIGHT SHOULDER: Chronic | Status: ACTIVE | Noted: 2018-10-10

## 2020-02-27 PROBLEM — S46.212A BICEPS TENDON RUPTURE, LEFT, INITIAL ENCOUNTER: Chronic | Status: ACTIVE | Noted: 2018-10-10

## 2020-02-27 PROBLEM — C44.320 SQUAMOUS CELL SKIN CANCER, FACE: Status: RESOLVED | Noted: 2019-01-20 | Resolved: 2020-02-27

## 2020-02-27 PROBLEM — M75.101 ROTATOR CUFF SYNDROME OF RIGHT SHOULDER: Chronic | Status: ACTIVE | Noted: 2018-10-16

## 2020-02-27 PROCEDURE — 99213 OFFICE O/P EST LOW 20 MIN: CPT | Performed by: FAMILY MEDICINE

## 2020-02-27 RX ORDER — CYCLOBENZAPRINE HCL 5 MG
TABLET ORAL
Qty: 20 TABLET | Refills: 1 | Status: SHIPPED | OUTPATIENT
Start: 2020-02-27 | End: 2020-11-03

## 2020-02-27 NOTE — PROGRESS NOTES
Subjective   Benito Bryson is a 64 y.o. male.     History of Present Illness   requesting evaluation mechanical back pain past 2 to 3 days right lower lumbar triangle comes and goes tightens up and gets loose.  We did a lot of lifting carrying power parcels past several days.  Minimal radiation to right low sacral area actually getting somewhat better today.  No localizing signs no rashes etc.  Also some question regards to Crestor dosing we have corrected to be 10 mg daily every day  HPI    The following portions of the patient's history were reviewed and updated as appropriate: allergies, current medications, past family history, past medical history, past social history, past surgical history and problem list.    Review of Systems  Review of Systems   Constitutional: Negative for activity change, appetite change, fatigue and unexpected weight change.   HENT: Negative for trouble swallowing and voice change.    Eyes: Negative for redness and visual disturbance.   Respiratory: Negative for cough and wheezing.    Cardiovascular: Negative for chest pain and palpitations.   Gastrointestinal: Negative for abdominal pain, constipation, diarrhea, nausea and vomiting.   Genitourinary: Negative for urgency.   Musculoskeletal: Positive for back pain. Negative for joint swelling.   Neurological: Negative for syncope and headaches.   Hematological: Negative for adenopathy.   Psychiatric/Behavioral: Negative for sleep disturbance.       Objective   Physical Exam  Physical Exam   Constitutional: He is oriented to person, place, and time. He appears well-developed.   HENT:   Head: Normocephalic.   Nose: Nose normal.   Eyes: Pupils are equal, round, and reactive to light.   Neck: Normal range of motion. No thyromegaly present.   Cardiovascular: Normal rate, regular rhythm and intact distal pulses. Exam reveals no gallop and no friction rub.   No murmur heard.  Pulmonary/Chest: Effort normal.   Abdominal: Soft. He exhibits  "no distension and no mass. There is no tenderness.   Musculoskeletal: Normal range of motion.        Lumbar back: He exhibits tenderness.   Mild tenderness to full range of motion flexion-extension.  Minimal pelvic tilt positive straight leg raise on the right at full 90 degrees plus only mild upper gluteal pain to full internal and external rotation.  Gait normal   Neurological: He is alert and oriented to person, place, and time. He has normal reflexes.   Reflex Scores:       Patellar reflexes are 2+ on the right side and 2+ on the left side.  Skin: Skin is warm and dry.   Psychiatric: He has a normal mood and affect.   No distress         Visit Vitals  /72 (BP Location: Left arm, Patient Position: Sitting, Cuff Size: Large Adult)   Ht 182.9 cm (72\")   Wt 91.2 kg (201 lb)   BMI 27.26 kg/m²     Body mass index is 27.26 kg/m².      Assessment/Plan   Benito was seen today for back pain.    Diagnoses and all orders for this visit:    Acute right-sided low back pain without sciatica  -     cyclobenzaprine (FLEXERIL) 5 MG tablet; 1 bid to tid prn back spasm    Familial hypercholesterolemia    Counseled on acute treatment heat rest massage stretching short-term medication counseled disease process.  Recheck in 7 to 10 days if not improving again reconfirmed cholesterol [place] use  "

## 2020-03-29 PROCEDURE — U0003 INFECTIOUS AGENT DETECTION BY NUCLEIC ACID (DNA OR RNA); SEVERE ACUTE RESPIRATORY SYNDROME CORONAVIRUS 2 (SARS-COV-2) (CORONAVIRUS DISEASE [COVID-19]), AMPLIFIED PROBE TECHNIQUE, MAKING USE OF HIGH THROUGHPUT TECHNOLOGIES AS DESCRIBED BY CMS-2020-01-R: HCPCS | Performed by: NURSE PRACTITIONER

## 2020-03-29 PROCEDURE — 87635 SARS-COV-2 COVID-19 AMP PRB: CPT | Performed by: NURSE PRACTITIONER

## 2020-04-09 ENCOUNTER — OFFICE VISIT (OUTPATIENT)
Dept: FAMILY MEDICINE CLINIC | Facility: CLINIC | Age: 65
End: 2020-04-09

## 2020-04-09 VITALS — HEIGHT: 72 IN | BODY MASS INDEX: 26.41 KG/M2 | WEIGHT: 195 LBS

## 2020-04-09 DIAGNOSIS — R39.11 URINARY HESITANCY: Primary | ICD-10-CM

## 2020-04-09 DIAGNOSIS — R35.1 BENIGN PROSTATIC HYPERPLASIA WITH NOCTURIA: ICD-10-CM

## 2020-04-09 DIAGNOSIS — N40.1 BENIGN PROSTATIC HYPERPLASIA WITH NOCTURIA: ICD-10-CM

## 2020-04-09 PROCEDURE — 99441 PR PHYS/QHP TELEPHONE EVALUATION 5-10 MIN: CPT | Performed by: FAMILY MEDICINE

## 2020-04-09 RX ORDER — TAMSULOSIN HYDROCHLORIDE 0.4 MG/1
CAPSULE ORAL
Qty: 30 CAPSULE | Refills: 1 | Status: SHIPPED | OUTPATIENT
Start: 2020-04-09 | End: 2020-05-01

## 2020-04-09 NOTE — PROGRESS NOTES
Subjective   Benito Brysno is a 65 y.o. male. You have chosen to receive care through a telephone visit today. Do you consent to use a telephone visit for your medical care today? Yes  .    History of Present Illness telephone visit due to pandemic.  Seen approximately 10 days ago plus for urinary hesitancy frequency difficulty voiding.  Urine was clear at that time.  Was placed on 10 days of antibiotics.  States symptoms are somewhat improved still having some nocturia.  Symptoms sound more prostatic in origin.  PSA done last year was within normal limits.  No fever no chills.    The following portions of the patient's history were reviewed and updated as appropriate: allergies, current medications, past family history, past medical history, past social history, past surgical history and problem list.    Review of Systems   Constitutional: Negative for activity change, appetite change, fatigue and unexpected weight change.   HENT: Negative for trouble swallowing and voice change.    Eyes: Negative for redness and visual disturbance.   Respiratory: Negative for cough and wheezing.    Cardiovascular: Negative for chest pain and palpitations.   Gastrointestinal: Negative for abdominal pain, constipation, diarrhea, nausea and vomiting.   Genitourinary: Positive for difficulty urinating and frequency. Negative for urgency.   Musculoskeletal: Negative for joint swelling.   Neurological: Negative for syncope and headaches.   Hematological: Negative for adenopathy.   Psychiatric/Behavioral: Negative for sleep disturbance.       Objective   Physical Exam   Constitutional: He appears well-developed.   Psychiatric: He has a normal mood and affect. His behavior is normal. Judgment and thought content normal.       Assessment/Plan   Diagnoses and all orders for this visit:    Urinary hesitancy  -     PSA DIAGNOSTIC  -     tamsulosin (FLOMAX) 0.4 MG capsule 24 hr capsule; 1 qhs for prostate    Benign prostatic hyperplasia  with nocturia  -     PSA DIAGNOSTIC  -     tamsulosin (FLOMAX) 0.4 MG capsule 24 hr capsule; 1 qhs for prostate        Suspect more prostatic in origin.  Perhaps low-grade BPH with symptoms versus low-grade prostatitis.  Continue to have symptoms therefore we will order a PSA diagnostic start low-dose Flomax counseled on disease process and recheck in 4 weeks  This visit has been rescheduled as a phone visit to comply with patient safety concerns in accordance with CDC recommendations. Total time of discussion was 8 minutes.

## 2020-04-10 ENCOUNTER — APPOINTMENT (OUTPATIENT)
Dept: LAB | Facility: HOSPITAL | Age: 65
End: 2020-04-10

## 2020-04-10 LAB — PSA SERPL-MCNC: 6.37 NG/ML (ref 0–4)

## 2020-04-10 PROCEDURE — 84153 ASSAY OF PSA TOTAL: CPT | Performed by: FAMILY MEDICINE

## 2020-04-10 PROCEDURE — 36415 COLL VENOUS BLD VENIPUNCTURE: CPT | Performed by: FAMILY MEDICINE

## 2020-04-11 DIAGNOSIS — R97.20 ELEVATED PSA: Primary | ICD-10-CM

## 2020-04-24 ENCOUNTER — LAB (OUTPATIENT)
Dept: LAB | Facility: HOSPITAL | Age: 65
End: 2020-04-24

## 2020-04-24 DIAGNOSIS — R97.20 ELEVATED PSA: ICD-10-CM

## 2020-04-24 LAB — PSA SERPL-MCNC: 3.14 NG/ML (ref 0–4)

## 2020-04-24 PROCEDURE — 84153 ASSAY OF PSA TOTAL: CPT

## 2020-04-24 PROCEDURE — 36415 COLL VENOUS BLD VENIPUNCTURE: CPT

## 2020-04-27 ENCOUNTER — TELEPHONE (OUTPATIENT)
Dept: FAMILY MEDICINE CLINIC | Facility: CLINIC | Age: 65
End: 2020-04-27

## 2020-05-01 DIAGNOSIS — R39.11 URINARY HESITANCY: ICD-10-CM

## 2020-05-01 DIAGNOSIS — R35.1 BENIGN PROSTATIC HYPERPLASIA WITH NOCTURIA: ICD-10-CM

## 2020-05-01 DIAGNOSIS — N40.1 BENIGN PROSTATIC HYPERPLASIA WITH NOCTURIA: ICD-10-CM

## 2020-05-01 RX ORDER — TAMSULOSIN HYDROCHLORIDE 0.4 MG/1
CAPSULE ORAL
Qty: 90 CAPSULE | Refills: 1 | Status: SHIPPED | OUTPATIENT
Start: 2020-05-01 | End: 2020-11-03

## 2020-08-26 DIAGNOSIS — I10 ESSENTIAL HYPERTENSION: Chronic | ICD-10-CM

## 2020-08-26 RX ORDER — ASPIRIN 81 MG/1
TABLET, COATED ORAL
Qty: 90 TABLET | Refills: 3 | Status: SHIPPED | OUTPATIENT
Start: 2020-08-26 | End: 2021-08-30

## 2020-11-03 DIAGNOSIS — M25.562 ACUTE PAIN OF BOTH KNEES: Primary | ICD-10-CM

## 2020-11-03 DIAGNOSIS — M25.561 ACUTE PAIN OF BOTH KNEES: Primary | ICD-10-CM

## 2020-11-05 ENCOUNTER — OFFICE VISIT (OUTPATIENT)
Dept: ORTHOPEDIC SURGERY | Facility: CLINIC | Age: 65
End: 2020-11-05

## 2020-11-05 VITALS — BODY MASS INDEX: 27.09 KG/M2 | HEIGHT: 72 IN | WEIGHT: 200 LBS

## 2020-11-05 DIAGNOSIS — M23.92 INTERNAL DERANGEMENT OF LEFT KNEE: ICD-10-CM

## 2020-11-05 DIAGNOSIS — M25.562 ACUTE PAIN OF LEFT KNEE: Primary | ICD-10-CM

## 2020-11-05 DIAGNOSIS — M25.462 EFFUSION OF LEFT KNEE: ICD-10-CM

## 2020-11-05 PROBLEM — M25.561 ACUTE PAIN OF BOTH KNEES: Status: ACTIVE | Noted: 2020-11-05

## 2020-11-05 PROBLEM — G89.29 CHRONIC PAIN OF LEFT KNEE: Status: ACTIVE | Noted: 2020-11-05

## 2020-11-05 PROCEDURE — 99214 OFFICE O/P EST MOD 30 MIN: CPT | Performed by: NURSE PRACTITIONER

## 2020-11-05 PROCEDURE — 20610 DRAIN/INJ JOINT/BURSA W/O US: CPT | Performed by: NURSE PRACTITIONER

## 2020-11-05 RX ADMIN — TRIAMCINOLONE ACETONIDE 40 MG: 40 INJECTION, SUSPENSION INTRA-ARTICULAR; INTRAMUSCULAR at 16:05

## 2020-11-05 RX ADMIN — LIDOCAINE HYDROCHLORIDE 2 ML: 10 INJECTION, SOLUTION INFILTRATION; PERINEURAL at 16:05

## 2020-11-05 NOTE — PROGRESS NOTES
Benito Bryson is a 65 y.o. male   Primary provider:  Gerry Corrales MD       Chief Complaint   Patient presents with   • Left Knee - Knee Pain       HISTORY OF PRESENT ILLNESS:    65-year-old male patient presents to office for evaluation of acute left knee pain.  Onset of pain occurred approximately 2 weeks ago with no known injury.  Just prior to the onset of pain, patient had been doing a lot of yard work and weed eating and he states he may have injured it then but he does not recall a specific incident. Patient was evaluated recently [facility] on 11[facility]/2020 with x-rays performed.  He was prescribed Voltaren, which she states is offering some pain improvement.  Pain is described as constant and mild to moderate in severity.  Pain is described as aching in nature with associated joint swelling.  Pain is worse with standing, walking and weightbearing activity.  Pain improves mildly with rest, ice therapy and anti-inflammatory medications.    Knee Pain   The incident occurred more than 1 week ago (about 2 weeks ago). There was no injury mechanism. The pain is present in the left knee. The pain is at a severity of 1/10. The pain is mild. The pain has been fluctuating since onset. He reports no foreign bodies present. The symptoms are aggravated by weight bearing (standing, walking). He has tried ice, NSAIDs and rest for the symptoms. The treatment provided mild relief.     CONCURRENT MEDICAL HISTORY:    Past Medical History:   Diagnosis Date   • Astigmatism     hyperopic   • Bradycardia    • Contusion of cerebrum (CMS/HCC)     history of   • Hyperlipidemia    • Hypertensive disorder        No Known Allergies      Current Outpatient Medications:   •  ASPIRIN LOW DOSE 81 MG EC tablet, TAKE 1 TABLET BY MOUTH DAILY., Disp: 90 tablet, Rfl: 3  •  diclofenac (VOLTAREN) 50 MG EC tablet, Take 1 tablet by mouth 2 (Two) Times a Day As Needed (knee pain)., Disp: 30 tablet, Rfl: 0  •  lisinopril  (PRINIVIL,ZESTRIL) 10 MG tablet, TAKE 1 TABLET BY MOUTH DAILY., Disp: 90 tablet, Rfl: 3  •  rosuvastatin (CRESTOR) 10 MG tablet, Take 1 tablet by mouth Every Night., Disp: 90 tablet, Rfl: 3    Past Surgical History:   Procedure Laterality Date   • COLONOSCOPY  12/20/2012    Colonoscopy, diagnostic (screening) 15358 (1)    Diverticulum in sigmoid colon. Internal & external hemorrhoids found.    • ENDOSCOPY  12/20/2012    EGD w/ tube 32751 (1)    Ulcer in middle 3rd of esophagus. Biopsy taken. Esophagitis seen in distal 3rd. of esophagus. Biopsy taken. Gastritis in stomach. Biopsy taken. Normal duodenum.    • EXCISION LESION  03/27/2014    Destruction of Premalignant Lesion (1st) 80019 (1)      • EXCISION LESION  03/21/2013    Shave Skin Lesions Face-MM 0.6 - 1 CM 82763 (1)      • OTHER SURGICAL HISTORY  12/05/2012    Avulsion of Nail Plate 39273 (1)   • SKIN BIOPSY  12/19/2011    Biopsy of Skin 30962 (2)      • VASECTOMY  12/28/1995    Vasectomy - ligate (1)    Elective        Family History   Problem Relation Age of Onset   • Diabetes Mother    • Alzheimer's disease Maternal Grandmother    • Osteoarthritis Other    • Vision loss Other        Social History     Socioeconomic History   • Marital status:      Spouse name: Not on file   • Number of children: Not on file   • Years of education: Not on file   • Highest education level: Not on file   Tobacco Use   • Smoking status: Never Smoker   • Smokeless tobacco: Never Used   Substance and Sexual Activity   • Alcohol use: Yes     Frequency: Monthly or less     Drinks per session: 1 or 2     Comment: social   • Drug use: No        Review of Systems   Constitutional: Positive for activity change.   HENT: Negative.    Eyes: Negative.    Respiratory: Negative.    Cardiovascular: Negative.    Gastrointestinal: Negative.    Endocrine: Negative.    Genitourinary: Negative.    Musculoskeletal: Positive for arthralgias, gait problem and joint swelling.        Left knee  "pain.    Skin: Negative.    Allergic/Immunologic: Negative.    Hematological: Negative.    Psychiatric/Behavioral: Negative.        PHYSICAL EXAMINATION:       Ht 182.9 cm (72\")   Wt 90.7 kg (200 lb)   BMI 27.12 kg/m²     Physical Exam  Vitals signs reviewed.   Constitutional:       General: He is not in acute distress.     Appearance: Normal appearance. He is well-developed. He is not ill-appearing.   HENT:      Head: Normocephalic.   Pulmonary:      Effort: Pulmonary effort is normal. No respiratory distress.   Abdominal:      General: There is no distension.      Palpations: Abdomen is soft.   Musculoskeletal:         General: Swelling (Left knee) and tenderness (Mild, left knee) present. No deformity or signs of injury.      Right knee: He exhibits no effusion.      Left knee: He exhibits effusion.   Skin:     General: Skin is warm and dry.      Capillary Refill: Capillary refill takes less than 2 seconds.      Findings: No bruising or erythema.   Neurological:      Mental Status: He is alert and oriented to person, place, and time.      GCS: GCS eye subscore is 4. GCS verbal subscore is 5. GCS motor subscore is 6.   Psychiatric:         Speech: Speech normal.         Behavior: Behavior normal.         Thought Content: Thought content normal.         Judgment: Judgment normal.         GAIT:     []  Normal  [x]  Antalgic    Assistive device: [x]  None  []  Walker     []  Crutches  []  Cane     []  Wheelchair  []  Stretcher    Right Knee Exam     Tenderness   The patient is experiencing no tenderness.     Range of Motion   The patient has normal right knee ROM.    Tests   Rosalinda:  Medial - negative Lateral - negative  Varus: negative Valgus: negative    Other   Erythema: absent  Sensation: normal  Pulse: present  Swelling: none  Effusion: no effusion present      Left Knee Exam     Tenderness   Left knee tenderness location: Mild, diffuse.    Range of Motion   Extension: 0   Flexion: 110     Tests   Rosalinda:  " Medial - negative Lateral - negative  Varus: negative Valgus: negative    Other   Erythema: absent  Sensation: normal  Pulse: present  Swelling: mild  Effusion: effusion present    Comments:  Mild pain and limitations with range of motion, primarily at the end range of flexion.  Mild swelling/effusion noted.  No erythema.  No warmth.  No signs of infection noted.            Xr Knee 1 Or 2 View Left    Result Date: 11/3/2020  Narrative: EXAM DESCRIPTION: LEFT KNEE TWO VIEWS CLINICAL HISTORY: Pain and swelling left knee. COMPARISON: 8/31/2018 FINDINGS:  AP and lateral projections of the left knee are obtained. The alignment and mineralization are considered within limits of normal. The articular surfaces are smooth. No fracture, dislocation, or suspicious osseous lesion. There is suggestion of small joint effusion.     Impression: No acute skeletal finding. Suggestion of small joint effusion. Electronically signed by:  Manuel Alvarado MD  11/3/2020 9:57 AM CST Workstation: [id number]    Xr Knee Bilateral Ap Standing    Result Date: 11/5/2020  Narrative: AP standing view of the bilateral knees reveals no evidence of acute fracture dislocation.  There are mild degenerative changes noted in the right knee with mild loss of medial compartmental joint spacing and very small marginal osteophyte formations noted.  No significant degenerative changes are noted in the left knee.  The joints appear well-aligned.  The bones appear well-mineralized.  Soft tissues appear unremarkable.  No acute bony radiologic abnormalities are noted at this time.  No significant changes are noted in the left knee when compared with prior AP images from 11/3/2020.  No prior comparison images of the right knee are available for review.11/05/20 at 16:59 CST by BART Edwards     Large Joint Arthrocentesis: L knee  Date/Time: 11/5/2020 4:05 PM  Consent given by: patient  Timeout: Immediately prior to procedure a time out was called to verify the  correct patient, procedure, equipment, support staff and site/side marked as required   Supporting Documentation  Indications: pain and joint swelling   Procedure Details  Location: knee - L knee  Preparation: Patient was prepped and draped in the usual sterile fashion  Needle size: 22 G  Approach: anterolateral  Medications administered: 2 mL lidocaine 1 %; 40 mg triamcinolone acetonide 40 MG/ML  Patient tolerance: patient tolerated the procedure well with no immediate complications            ASSESSMENT:    Diagnoses and all orders for this visit:    Acute pain of left knee  -     Large Joint Arthrocentesis: L knee    Internal derangement of left knee    Effusion of left knee    PLAN    AP standing x-ray of the bilateral knees performed in office today reviewed with no acute findings noted.  Patient has some mild degenerative changes bilaterally, but nothing significant.  Patient complains of acute left knee pain/swelling/effusion that started about 2 weeks ago with no particular injury.  He may have injured it while working in the yard but he does not recall a specific incident.  We discussed multiple possibilities, including meniscal tear.  Recommend a trial of an intra-articular injection of steroid to the left knee joint today for management of joint pain/inflammation/swelling.  Recommend rest and activity modification as tolerated and based on his pain.  Recommend use of a cane as needed for modified weightbearing off the left knee.  We discussed that he can gradually progress his weightbearing and activity as pain and swelling allow.  Recommend a hinged knee brace to the left knee for added support.  This is placed/fitted in office today.  Recommend elevation and ice therapy to the left knee intermittently 3-4 times daily for 15 minutes at a time to minimize pain/swelling/inflammation.  Recommend to continue Voltaren twice daily for consistent dosing of oral NSAIDs as previously prescribed.  Patient is  instructed not to take any additional oral NSAIDs, such as Ibuprofen or Aleve, while taking Voltaren.  Patient can also take Tylenol as needed for additional pain control.  Follow-up in 4 weeks for recheck.  Follow-up sooner as needed for any worsening symptoms.  Consider MRI of the left knee to evaluate for possible internal derangement and/or meniscal tear if his pain/symptoms persist.    Return in about 4 weeks (around 12/3/2020) for Recheck.      This document has been electronically signed by BART Edwards on November 8, 2020 12:46 CST    BART Edwards

## 2020-11-08 PROBLEM — M23.92 INTERNAL DERANGEMENT OF LEFT KNEE: Status: ACTIVE | Noted: 2020-11-08

## 2020-11-08 PROBLEM — M25.462 EFFUSION OF LEFT KNEE: Status: ACTIVE | Noted: 2020-11-08

## 2020-11-08 RX ORDER — TRIAMCINOLONE ACETONIDE 40 MG/ML
40 INJECTION, SUSPENSION INTRA-ARTICULAR; INTRAMUSCULAR
Status: COMPLETED | OUTPATIENT
Start: 2020-11-05 | End: 2020-11-05

## 2020-11-08 RX ORDER — LIDOCAINE HYDROCHLORIDE 10 MG/ML
2 INJECTION, SOLUTION INFILTRATION; PERINEURAL
Status: COMPLETED | OUTPATIENT
Start: 2020-11-05 | End: 2020-11-05

## 2020-12-17 DIAGNOSIS — I10 ESSENTIAL HYPERTENSION: ICD-10-CM

## 2020-12-17 RX ORDER — LISINOPRIL 10 MG/1
TABLET ORAL
Qty: 30 TABLET | Refills: 0 | Status: SHIPPED | OUTPATIENT
Start: 2020-12-17 | End: 2021-01-26

## 2021-01-08 ENCOUNTER — TELEPHONE (OUTPATIENT)
Dept: FAMILY MEDICINE CLINIC | Facility: CLINIC | Age: 66
End: 2021-01-08

## 2021-01-08 DIAGNOSIS — E78.01 FAMILIAL HYPERCHOLESTEROLEMIA: Chronic | ICD-10-CM

## 2021-01-08 RX ORDER — ROSUVASTATIN CALCIUM 10 MG/1
10 TABLET, COATED ORAL DAILY
Qty: 90 TABLET | Refills: 0 | Status: SHIPPED | OUTPATIENT
Start: 2021-01-08 | End: 2021-02-22 | Stop reason: SDUPTHER

## 2021-01-08 RX ORDER — ROSUVASTATIN CALCIUM 10 MG/1
TABLET, COATED ORAL
Qty: 45 TABLET | Refills: 0 | Status: SHIPPED | OUTPATIENT
Start: 2021-01-08 | End: 2021-01-08

## 2021-01-08 NOTE — TELEPHONE ENCOUNTER
PATIENT CALLING IN REQUESTING A CALL BACK TO GO OVER SOME QUESTIONS ON HOW HE SHOULD BE TAKING HIS  rosuvastatin (CRESTOR) 10 MG tablet PLEASE ADVISE.             CALL BACK NUMBER: [phone]

## 2021-01-20 ENCOUNTER — IMMUNIZATION (OUTPATIENT)
Dept: VACCINE CLINIC | Facility: HOSPITAL | Age: 66
End: 2021-01-20

## 2021-01-20 PROCEDURE — 91300 HC SARSCOV02 VAC 30MCG/0.3ML IM: CPT | Performed by: THORACIC SURGERY (CARDIOTHORACIC VASCULAR SURGERY)

## 2021-01-20 PROCEDURE — 0001A: CPT | Performed by: THORACIC SURGERY (CARDIOTHORACIC VASCULAR SURGERY)

## 2021-01-26 DIAGNOSIS — I10 ESSENTIAL HYPERTENSION: ICD-10-CM

## 2021-01-26 RX ORDER — LISINOPRIL 10 MG/1
TABLET ORAL
Qty: 30 TABLET | Refills: 0 | Status: SHIPPED | OUTPATIENT
Start: 2021-01-26 | End: 2021-02-19

## 2021-02-10 ENCOUNTER — IMMUNIZATION (OUTPATIENT)
Dept: VACCINE CLINIC | Facility: HOSPITAL | Age: 66
End: 2021-02-10

## 2021-02-10 PROCEDURE — 91300 HC SARSCOV02 VAC 30MCG/0.3ML IM: CPT | Performed by: THORACIC SURGERY (CARDIOTHORACIC VASCULAR SURGERY)

## 2021-02-10 PROCEDURE — 0002A: CPT | Performed by: THORACIC SURGERY (CARDIOTHORACIC VASCULAR SURGERY)

## 2021-02-19 ENCOUNTER — TELEPHONE (OUTPATIENT)
Dept: FAMILY MEDICINE CLINIC | Facility: CLINIC | Age: 66
End: 2021-02-19

## 2021-02-19 DIAGNOSIS — I10 ESSENTIAL HYPERTENSION: ICD-10-CM

## 2021-02-19 RX ORDER — LISINOPRIL 10 MG/1
TABLET ORAL
Qty: 30 TABLET | Refills: 0 | Status: SHIPPED | OUTPATIENT
Start: 2021-02-19 | End: 2021-02-22 | Stop reason: SDUPTHER

## 2021-02-22 ENCOUNTER — TELEMEDICINE (OUTPATIENT)
Dept: FAMILY MEDICINE CLINIC | Facility: CLINIC | Age: 66
End: 2021-02-22

## 2021-02-22 VITALS — WEIGHT: 200 LBS | DIASTOLIC BLOOD PRESSURE: 74 MMHG | BODY MASS INDEX: 27.12 KG/M2 | SYSTOLIC BLOOD PRESSURE: 122 MMHG

## 2021-02-22 DIAGNOSIS — I10 ESSENTIAL HYPERTENSION: Chronic | ICD-10-CM

## 2021-02-22 DIAGNOSIS — Z12.5 SCREENING FOR MALIGNANT NEOPLASM OF PROSTATE: ICD-10-CM

## 2021-02-22 DIAGNOSIS — E78.01 FAMILIAL HYPERCHOLESTEROLEMIA: Primary | Chronic | ICD-10-CM

## 2021-02-22 PROBLEM — S46.212A BICEPS TENDON RUPTURE, LEFT, INITIAL ENCOUNTER: Chronic | Status: RESOLVED | Noted: 2018-10-10 | Resolved: 2021-02-22

## 2021-02-22 PROBLEM — G89.29 CHRONIC PAIN OF LEFT KNEE: Chronic | Status: ACTIVE | Noted: 2020-11-05

## 2021-02-22 PROBLEM — M75.01 ADHESIVE CAPSULITIS OF RIGHT SHOULDER: Chronic | Status: RESOLVED | Noted: 2018-10-10 | Resolved: 2021-02-22

## 2021-02-22 PROBLEM — M25.562 CHRONIC PAIN OF LEFT KNEE: Chronic | Status: ACTIVE | Noted: 2020-11-05

## 2021-02-22 PROBLEM — M23.92 INTERNAL DERANGEMENT OF LEFT KNEE: Chronic | Status: ACTIVE | Noted: 2020-11-08

## 2021-02-22 PROBLEM — M25.462 EFFUSION OF LEFT KNEE: Status: RESOLVED | Noted: 2020-11-08 | Resolved: 2021-02-22

## 2021-02-22 PROCEDURE — 99213 OFFICE O/P EST LOW 20 MIN: CPT | Performed by: FAMILY MEDICINE

## 2021-02-22 RX ORDER — ROSUVASTATIN CALCIUM 10 MG/1
10 TABLET, COATED ORAL DAILY
Qty: 90 TABLET | Refills: 3 | Status: SHIPPED | OUTPATIENT
Start: 2021-02-22 | End: 2021-09-02

## 2021-02-22 RX ORDER — LISINOPRIL 10 MG/1
10 TABLET ORAL DAILY
Qty: 90 TABLET | Refills: 3 | Status: SHIPPED | OUTPATIENT
Start: 2021-02-22 | End: 2022-03-01

## 2021-02-22 NOTE — PROGRESS NOTES
Subjective   Benito Bryson is a 65 y.o. male. You have chosen to receive care through a telehealth visit.  Do you consent to use a video/audio connection for your medical care today? Yes      History of Present Illness DO visit due to work schedule.  Follow-up of mild hyperlipidemia hypertension.  Mentions maintain weight and blood pressure.  Is due for lab work tomorrow.  Has had a Cologuard within the last year.  Is also had Covid vaccine update.  Will get shingles at a later date.  Overall feels well no complaints.  HPI    The following portions of the patient's history were reviewed and updated as appropriate: allergies, current medications, past family history, past medical history, past social history, past surgical history and problem list.    Review of Systems  Review of Systems   Constitutional: Negative for activity change, appetite change, fatigue and unexpected weight change.   HENT: Negative for trouble swallowing and voice change.    Eyes: Negative for redness and visual disturbance.   Respiratory: Negative for cough and wheezing.    Cardiovascular: Negative for chest pain and palpitations.   Gastrointestinal: Negative for abdominal pain, constipation, diarrhea, nausea and vomiting.   Genitourinary: Negative for urgency.   Musculoskeletal: Negative for joint swelling.   Neurological: Negative for syncope and headaches.   Hematological: Negative for adenopathy.   Psychiatric/Behavioral: Negative for sleep disturbance.       Objective   Physical Exam  Physical Exam  Constitutional:       Appearance: Normal appearance. He is well-developed and normal weight.   HENT:      Head: Normocephalic.      Right Ear: External ear normal.   Eyes:      Pupils: Pupils are equal, round, and reactive to light.   Neck:      Musculoskeletal: Normal range of motion.      Thyroid: No thyromegaly.   Cardiovascular:      Rate and Rhythm: Normal rate.      Heart sounds: No murmur. No friction rub. No gallop.     Abdominal:      General: There is no distension.      Palpations: Abdomen is soft. There is no mass.      Tenderness: There is no abdominal tenderness.   Musculoskeletal: Normal range of motion.   Skin:     General: Skin is warm and dry.   Neurological:      Mental Status: He is alert and oriented to person, place, and time.      Deep Tendon Reflexes: Reflexes are normal and symmetric.   Psychiatric:         Mood and Affect: Mood normal.         Thought Content: Thought content normal.         Judgment: Judgment normal.           Visit Vitals  /74   Wt 90.7 kg (200 lb)   BMI 27.12 kg/m²     Body mass index is 27.12 kg/m².      Assessment/Plan   Diagnoses and all orders for this visit:    1. Familial hypercholesterolemia (Primary)  -     Lipid Panel With LDL / HDL Ratio; Future  -     rosuvastatin (Crestor) 10 MG tablet; Take 1 tablet by mouth Daily.  Dispense: 90 tablet; Refill: 3    2. Essential hypertension  -     Magnesium; Future  -     Comprehensive Metabolic Panel; Future  -     lisinopril (PRINIVIL,ZESTRIL) 10 MG tablet; Take 1 tablet by mouth Daily.  Dispense: 90 tablet; Refill: 3    3. Screening for malignant neoplasm of prostate  -     PSA Screen; Future    Unseld on lifestyle measures hydration etc.  Orders as above recheck 6 months  This was an audio and video enabled telemedicine encounter. The time that was spent in reviewing the patient's chart and addressing their symptoms, diagnosis and treatment was 15 mins.

## 2021-02-24 ENCOUNTER — LAB (OUTPATIENT)
Dept: LAB | Facility: HOSPITAL | Age: 66
End: 2021-02-24

## 2021-02-24 ENCOUNTER — TELEPHONE (OUTPATIENT)
Dept: FAMILY MEDICINE CLINIC | Facility: CLINIC | Age: 66
End: 2021-02-24

## 2021-02-24 DIAGNOSIS — E78.01 FAMILIAL HYPERCHOLESTEROLEMIA: Chronic | ICD-10-CM

## 2021-02-24 DIAGNOSIS — Z12.5 SCREENING FOR MALIGNANT NEOPLASM OF PROSTATE: ICD-10-CM

## 2021-02-24 DIAGNOSIS — I10 ESSENTIAL HYPERTENSION: Chronic | ICD-10-CM

## 2021-02-24 PROCEDURE — 36415 COLL VENOUS BLD VENIPUNCTURE: CPT

## 2021-02-24 PROCEDURE — G0103 PSA SCREENING: HCPCS

## 2021-02-24 PROCEDURE — 80053 COMPREHEN METABOLIC PANEL: CPT

## 2021-02-24 PROCEDURE — 83735 ASSAY OF MAGNESIUM: CPT

## 2021-02-24 PROCEDURE — 80061 LIPID PANEL: CPT

## 2021-02-24 NOTE — TELEPHONE ENCOUNTER
PATIENT CALLED UPSET AS HIS lisinopril (PRINIVIL,ZESTRIL) 10 MG tablet WAS CALLED IN AGAIN AND HE ONLY GOT 30 DAY SUPPLY AND HE IS SUPPOSED TO GET 90.  THIS IS THE SECOND TIME THIS HAPPENED AND WANTS TO KNOW WHY AND WANTS HIS 90 SUPPLY CALLED IN.    PLEASE CALL AND ADVISE  [phone]

## 2021-02-25 LAB
ALBUMIN SERPL-MCNC: 4.3 G/DL (ref 3.5–5.2)
ALBUMIN/GLOB SERPL: 1.4 G/DL
ALP SERPL-CCNC: 60 U/L (ref 39–117)
ALT SERPL W P-5'-P-CCNC: 21 U/L (ref 1–41)
ANION GAP SERPL CALCULATED.3IONS-SCNC: 10.1 MMOL/L (ref 5–15)
AST SERPL-CCNC: 24 U/L (ref 1–40)
BILIRUB SERPL-MCNC: 0.4 MG/DL (ref 0–1.2)
BUN SERPL-MCNC: 21 MG/DL (ref 8–23)
BUN/CREAT SERPL: 17.4 (ref 7–25)
CALCIUM SPEC-SCNC: 9 MG/DL (ref 8.6–10.5)
CHLORIDE SERPL-SCNC: 104 MMOL/L (ref 98–107)
CHOLEST SERPL-MCNC: 150 MG/DL (ref 0–200)
CO2 SERPL-SCNC: 22.9 MMOL/L (ref 22–29)
CREAT SERPL-MCNC: 1.21 MG/DL (ref 0.76–1.27)
GFR SERPL CREATININE-BSD FRML MDRD: 60 ML/MIN/1.73
GLOBULIN UR ELPH-MCNC: 3.1 GM/DL
GLUCOSE SERPL-MCNC: 81 MG/DL (ref 65–99)
HDLC SERPL-MCNC: 41 MG/DL (ref 40–60)
LDLC SERPL CALC-MCNC: 85 MG/DL (ref 0–100)
LDLC/HDLC SERPL: 2 {RATIO}
MAGNESIUM SERPL-MCNC: 2.2 MG/DL (ref 1.6–2.4)
POTASSIUM SERPL-SCNC: 4.2 MMOL/L (ref 3.5–5.2)
PROT SERPL-MCNC: 7.4 G/DL (ref 6–8.5)
PSA SERPL-MCNC: 1.27 NG/ML (ref 0–4)
SODIUM SERPL-SCNC: 137 MMOL/L (ref 136–145)
TRIGL SERPL-MCNC: 135 MG/DL (ref 0–150)
VLDLC SERPL-MCNC: 24 MG/DL (ref 5–40)

## 2021-05-24 DIAGNOSIS — M79.645 THUMB PAIN, LEFT: Primary | ICD-10-CM

## 2021-05-27 ENCOUNTER — OFFICE VISIT (OUTPATIENT)
Dept: ORTHOPEDIC SURGERY | Facility: CLINIC | Age: 66
End: 2021-05-27

## 2021-05-27 VITALS — HEIGHT: 72 IN | BODY MASS INDEX: 26.95 KG/M2 | WEIGHT: 199 LBS

## 2021-05-27 DIAGNOSIS — M79.645 THUMB PAIN, LEFT: Primary | ICD-10-CM

## 2021-05-27 DIAGNOSIS — M18.12 PRIMARY OSTEOARTHRITIS OF FIRST CARPOMETACARPAL JOINT OF LEFT HAND: ICD-10-CM

## 2021-05-27 PROBLEM — R20.2 NUMBNESS AND TINGLING OF LEFT THUMB: Status: ACTIVE | Noted: 2021-05-27

## 2021-05-27 PROBLEM — R20.0 NUMBNESS AND TINGLING OF LEFT THUMB: Status: ACTIVE | Noted: 2021-05-27

## 2021-05-27 PROCEDURE — 20600 DRAIN/INJ JOINT/BURSA W/O US: CPT | Performed by: NURSE PRACTITIONER

## 2021-05-27 PROCEDURE — 99214 OFFICE O/P EST MOD 30 MIN: CPT | Performed by: NURSE PRACTITIONER

## 2021-05-27 RX ADMIN — LIDOCAINE HYDROCHLORIDE 0.5 ML: 10 INJECTION, SOLUTION INFILTRATION; PERINEURAL at 16:18

## 2021-05-27 RX ADMIN — TRIAMCINOLONE ACETONIDE 20 MG: 40 INJECTION, SUSPENSION INTRA-ARTICULAR; INTRAMUSCULAR at 16:18

## 2021-06-02 RX ORDER — TRIAMCINOLONE ACETONIDE 40 MG/ML
20 INJECTION, SUSPENSION INTRA-ARTICULAR; INTRAMUSCULAR
Status: COMPLETED | OUTPATIENT
Start: 2021-05-27 | End: 2021-05-27

## 2021-06-02 RX ORDER — LIDOCAINE HYDROCHLORIDE 10 MG/ML
0.5 INJECTION, SOLUTION INFILTRATION; PERINEURAL
Status: COMPLETED | OUTPATIENT
Start: 2021-05-27 | End: 2021-05-27

## 2021-07-29 ENCOUNTER — LAB (OUTPATIENT)
Dept: LAB | Facility: HOSPITAL | Age: 66
End: 2021-07-29

## 2021-07-29 ENCOUNTER — OFFICE VISIT (OUTPATIENT)
Dept: CARDIOLOGY | Facility: CLINIC | Age: 66
End: 2021-07-29

## 2021-07-29 VITALS
HEART RATE: 64 BPM | SYSTOLIC BLOOD PRESSURE: 118 MMHG | DIASTOLIC BLOOD PRESSURE: 72 MMHG | OXYGEN SATURATION: 99 % | HEIGHT: 72 IN | WEIGHT: 201.8 LBS | RESPIRATION RATE: 22 BRPM | BODY MASS INDEX: 27.33 KG/M2

## 2021-07-29 DIAGNOSIS — R53.82 CHRONIC FATIGUE: ICD-10-CM

## 2021-07-29 DIAGNOSIS — R07.89 CHEST PRESSURE: ICD-10-CM

## 2021-07-29 DIAGNOSIS — E78.01 FAMILIAL HYPERCHOLESTEROLEMIA: Chronic | ICD-10-CM

## 2021-07-29 DIAGNOSIS — I10 ESSENTIAL HYPERTENSION: Primary | ICD-10-CM

## 2021-07-29 DIAGNOSIS — I10 ESSENTIAL HYPERTENSION: Chronic | ICD-10-CM

## 2021-07-29 LAB
ALBUMIN SERPL-MCNC: 4.1 G/DL (ref 3.5–5.2)
ALBUMIN/GLOB SERPL: 1.3 G/DL
ALP SERPL-CCNC: 61 U/L (ref 39–117)
ALT SERPL W P-5'-P-CCNC: 23 U/L (ref 1–41)
ANION GAP SERPL CALCULATED.3IONS-SCNC: 10.7 MMOL/L (ref 5–15)
AST SERPL-CCNC: 23 U/L (ref 1–40)
BASOPHILS # BLD AUTO: 0.04 10*3/MM3 (ref 0–0.2)
BASOPHILS NFR BLD AUTO: 0.5 % (ref 0–1.5)
BILIRUB SERPL-MCNC: 0.4 MG/DL (ref 0–1.2)
BUN SERPL-MCNC: 19 MG/DL (ref 8–23)
BUN/CREAT SERPL: 16.2 (ref 7–25)
CALCIUM SPEC-SCNC: 9.1 MG/DL (ref 8.6–10.5)
CHLORIDE SERPL-SCNC: 104 MMOL/L (ref 98–107)
CO2 SERPL-SCNC: 22.3 MMOL/L (ref 22–29)
CREAT SERPL-MCNC: 1.17 MG/DL (ref 0.76–1.27)
DEPRECATED RDW RBC AUTO: 42.4 FL (ref 37–54)
EOSINOPHIL # BLD AUTO: 0.27 10*3/MM3 (ref 0–0.4)
EOSINOPHIL NFR BLD AUTO: 3.4 % (ref 0.3–6.2)
ERYTHROCYTE [DISTWIDTH] IN BLOOD BY AUTOMATED COUNT: 13 % (ref 12.3–15.4)
GFR SERPL CREATININE-BSD FRML MDRD: 63 ML/MIN/1.73
GLOBULIN UR ELPH-MCNC: 3.2 GM/DL
GLUCOSE SERPL-MCNC: 98 MG/DL (ref 65–99)
HBA1C MFR BLD: 5.5 % (ref 4.8–5.6)
HCT VFR BLD AUTO: 44.6 % (ref 37.5–51)
HGB BLD-MCNC: 15.6 G/DL (ref 13–17.7)
IMM GRANULOCYTES # BLD AUTO: 0.03 10*3/MM3 (ref 0–0.05)
IMM GRANULOCYTES NFR BLD AUTO: 0.4 % (ref 0–0.5)
LYMPHOCYTES # BLD AUTO: 1.55 10*3/MM3 (ref 0.7–3.1)
LYMPHOCYTES NFR BLD AUTO: 19.5 % (ref 19.6–45.3)
MCH RBC QN AUTO: 31.8 PG (ref 26.6–33)
MCHC RBC AUTO-ENTMCNC: 35 G/DL (ref 31.5–35.7)
MCV RBC AUTO: 91 FL (ref 79–97)
MONOCYTES # BLD AUTO: 0.93 10*3/MM3 (ref 0.1–0.9)
MONOCYTES NFR BLD AUTO: 11.7 % (ref 5–12)
NEUTROPHILS NFR BLD AUTO: 5.14 10*3/MM3 (ref 1.7–7)
NEUTROPHILS NFR BLD AUTO: 64.5 % (ref 42.7–76)
NRBC BLD AUTO-RTO: 0 /100 WBC (ref 0–0.2)
PLATELET # BLD AUTO: 220 10*3/MM3 (ref 140–450)
PMV BLD AUTO: 9.7 FL (ref 6–12)
POTASSIUM SERPL-SCNC: 4.3 MMOL/L (ref 3.5–5.2)
PROT SERPL-MCNC: 7.3 G/DL (ref 6–8.5)
RBC # BLD AUTO: 4.9 10*6/MM3 (ref 4.14–5.8)
SODIUM SERPL-SCNC: 137 MMOL/L (ref 136–145)
T4 FREE SERPL-MCNC: 1.1 NG/DL (ref 0.93–1.7)
TSH SERPL DL<=0.05 MIU/L-ACNC: 3.23 UIU/ML (ref 0.27–4.2)
WBC # BLD AUTO: 7.96 10*3/MM3 (ref 3.4–10.8)

## 2021-07-29 PROCEDURE — 36415 COLL VENOUS BLD VENIPUNCTURE: CPT | Performed by: INTERNAL MEDICINE

## 2021-07-29 PROCEDURE — 84443 ASSAY THYROID STIM HORMONE: CPT | Performed by: INTERNAL MEDICINE

## 2021-07-29 PROCEDURE — 85025 COMPLETE CBC W/AUTO DIFF WBC: CPT | Performed by: INTERNAL MEDICINE

## 2021-07-29 PROCEDURE — 99243 OFF/OP CNSLTJ NEW/EST LOW 30: CPT | Performed by: INTERNAL MEDICINE

## 2021-07-29 PROCEDURE — 80053 COMPREHEN METABOLIC PANEL: CPT | Performed by: INTERNAL MEDICINE

## 2021-07-29 PROCEDURE — 93000 ELECTROCARDIOGRAM COMPLETE: CPT | Performed by: INTERNAL MEDICINE

## 2021-07-29 PROCEDURE — 83036 HEMOGLOBIN GLYCOSYLATED A1C: CPT | Performed by: INTERNAL MEDICINE

## 2021-07-29 PROCEDURE — 84439 ASSAY OF FREE THYROXINE: CPT

## 2021-07-29 NOTE — PROGRESS NOTES
Benito Bryson  66 y.o. male    07/29/2021  1. Essential hypertension    2. Familial hypercholesterolemia    3. Chest pressure    4. Chronic fatigue        History of Present Illness  Benito Bryson is a 66-year-old male who is being seen by me for the first time.  He presents for evaluation of a 1 week history of generalized fatigue, generalized weakness/ more so  in the legs, intermittent chest pressure.  He was in [place] last week and though he was able to go on with his activities, there was a general lack of motivation.  He had an increased cardiac awareness and some degree of pressure with no gregory pain.  There is no associated diaphoresis or shortness of breath.  He denied any fevers, chills, sore throat, headache.  However he did have some degree of nausea and vague abdominal discomfort with no diarrhea or melena.  No urinary symptoms are reported.  He is normally physically quite active and denied any previous history of coronary artery disease or valvular heart disease.  He has history of hypertension and hyperlipidemia for which he is on medications.  No history of thyroid problems or diabetes mellitus.  His lipid profiles were normal when checked in February 2021.  Patient has had the the Covid Pfizer vaccine.    EKG today showed sinus rhythm with heart rate of 58 bpm.  Small Q waves in the inferior leads.    SUBJECTIVE    No Known Allergies      Past Medical History:   Diagnosis Date   • Astigmatism     hyperopic   • Bradycardia    • Contusion of cerebrum (CMS/HCC)     history of   • Hyperlipidemia    • Hypertensive disorder          Past Surgical History:   Procedure Laterality Date   • COLONOSCOPY  12/20/2012    Colonoscopy, diagnostic (screening) 33033 (1)    Diverticulum in sigmoid colon. Internal & external hemorrhoids found.    • ENDOSCOPY  12/20/2012    EGD w/ tube 66535 (1)    Ulcer in middle 3rd of esophagus. Biopsy taken. Esophagitis seen in distal 3rd. of esophagus. Biopsy taken.  "Gastritis in stomach. Biopsy taken. Normal duodenum.    • EXCISION LESION  03/27/2014    Destruction of Premalignant Lesion (1st) 09828 (1)      • EXCISION LESION  03/21/2013    Shave Skin Lesions Face-MM 0.6 - 1 CM 92516 (1)      • OTHER SURGICAL HISTORY  12/05/2012    Avulsion of Nail Plate 10839 (1)   • SKIN BIOPSY  12/19/2011    Biopsy of Skin 21757 (2)      • VASECTOMY  12/28/1995    Vasectomy - ligate (1)    Elective          Family History   Problem Relation Age of Onset   • Diabetes Mother    • Alzheimer's disease Maternal Grandmother    • Osteoarthritis Other    • Vision loss Other          Social History     Socioeconomic History   • Marital status:      Spouse name: Not on file   • Number of children: Not on file   • Years of education: Not on file   • Highest education level: Not on file   Tobacco Use   • Smoking status: Never Smoker   • Smokeless tobacco: Never Used   Substance and Sexual Activity   • Alcohol use: Yes     Comment: social   • Drug use: No         Current Outpatient Medications   Medication Sig Dispense Refill   • ASPIRIN LOW DOSE 81 MG EC tablet TAKE 1 TABLET BY MOUTH DAILY. 90 tablet 3   • lisinopril (PRINIVIL,ZESTRIL) 10 MG tablet Take 1 tablet by mouth Daily. 90 tablet 3   • rosuvastatin (Crestor) 10 MG tablet Take 1 tablet by mouth Daily. 90 tablet 3     No current facility-administered medications for this visit.         OBJECTIVE    /72 (BP Location: Left arm, Patient Position: Sitting, Cuff Size: Adult)   Pulse 64   Resp 22   Ht 182.9 cm (72\")   Wt 91.5 kg (201 lb 12.8 oz)   SpO2 99%   BMI 27.37 kg/m²         Review of Systems     Constitutional:  Denies recent weight loss, weight gain, fever or chills, fatigue.     HENT:  Denies any hearing loss, epistaxis, hoarseness, or difficulty speaking.     Eyes: Wears eyeglasses or contact lenses     Respiratory:  Denies dyspnea with exertion,no cough, wheezing, or hemoptysis.     Cardiovascular: See " HPI    Gastrointestinal: Nausea, mild abdominal discomfort.  No ulcer disease, hematochezia, or melena.     Endocrine: Negative for cold intolerance, heat intolerance, polydipsia, polyphagia and polyuria. Denies any history of weight change, heat/cold intolerance, polydipsia, polyuria     Genitourinary: Negative.      Musculoskeletal: Denies any history of arthritic symptoms or back problems     Skin:  Denies any change in hair or nails, rashes, or skin lesions.     Allergic/Immunologic: Negative.  Negative for environmental allergies, food allergies and immunocompromised state.     Neurological:  Denies any history of recurrent headaches, strokes, TIA, or seizure disorder.     Hematological: Denies any food allergies, seasonal allergies, bleeding disorders, or lymphadenopathy.     Psychiatric/Behavioral: Denies any history of depression, substance abuse, or change in cognitive function.       Physical Exam     Constitutional: Cooperative, alert and oriented, in no acute distress.     HENT:   Head: Normocephalic, normal hair patterns, no masses or tenderness.  Ears, Nose, and Throat: No gross abnormalities. No pallor or cyanosis. Dentition good.   Eyes: EOMS intact, PERRL, conjunctivae and lids unremarkable. Fundoscopic exam and visual fields not performed.   Neck: No palpable masses or adenopathy, no thyromegaly, no JVD, carotid pulses are full and equal bilaterally and without  Bruits.     Cardiovascular: Regular rhythm, S1 and S2 normal, no S3 or S4.  No murmurs, gallops, or rubs detected.     Pulmonary/Chest: Chest: normal symmetry, normal respiratory excursion, no intercostal retraction, no use of accessory muscles.            Pulmonary: Normal breath sounds. No rales or ronchi.    Abdominal: Abdomen soft, bowel sounds normoactive, no masses, no hepatosplenomegaly, non-tender, no bruits.     Musculoskeletal: No deformities, clubbing, cyanosis, erythema, or edema observed. There are no spinal abnormalities  noted. Normal muscle strength and tone. Pulses full and equal in all extremities, no bruits auscultated.     Neurological: No gross motor or sensory deficits noted, affect appropriate, oriented to time, person, place.     Skin: Warm and dry to the touch, no apparent skin lesions or masses noted.     Psychiatric: He has a normal mood and affect. His behavior is normal. Judgment and thought content normal.         Procedures      Lab Results   Component Value Date    WBC 6.0 07/08/2015    HGB 16.3 07/08/2015    HCT 46.8 07/08/2015    MCV 89.3 07/08/2015     07/08/2015     Lab Results   Component Value Date    GLUCOSE 81 02/24/2021    BUN 21 02/24/2021    CREATININE 1.21 02/24/2021    EGFRIFNONA 60 (L) 02/24/2021    BCR 17.4 02/24/2021    CO2 22.9 02/24/2021    CALCIUM 9.0 02/24/2021    ALBUMIN 4.30 02/24/2021    AST 24 02/24/2021    ALT 21 02/24/2021     Lab Results   Component Value Date    CHOL 150 02/24/2021    CHOL 164 12/19/2019    CHOL 182 02/27/2019     Lab Results   Component Value Date    TRIG 135 02/24/2021    TRIG 128 12/19/2019    TRIG 99 02/27/2019     Lab Results   Component Value Date    HDL 41 02/24/2021    HDL 45 12/19/2019    HDL 49 (L) 02/27/2019     No components found for: LDLCALC  Lab Results   Component Value Date    LDL 85 02/24/2021    LDL 93 12/19/2019     02/27/2019     No results found for: HDLLDLRATIO  No components found for: CHOLHDL  Lab Results   Component Value Date    HGBA1C 5.75 (H) 12/19/2019     Lab Results   Component Value Date    TSH 4.07 07/08/2015           ASSESSMENT AND PLAN  Benito Bryson is a 66-year-old male who is presented with a rather unusual set of complaints including generalized fatigue, generalized weakness, increased awareness of the heart with mild chest pressure in the background of hypertension and hyperlipidemia.  He is concerned about possible cardiac etiology.    To further evaluate his symptoms lab tests indicated below have been ordered to  make sure that there are no unsuspected etiologies such as thyroid abnormalities, diabetes mellitus or anemia.  To make sure that there is no underlying coronary artery disease and to assess his exercise tolerance and exercise Cardiolite stress test is being arranged.  Echocardiogram to assess left ventricular and valvular function has been arranged.    I have encouraged him to maintain a high fluid intake and antiplatelet therapy with aspirin, antihypertensive therapy with lisinopril and lipid-lowering therapy with Crestor have been continued.  Further recommendations will follow.    Diagnoses and all orders for this visit:    1. Essential hypertension (Primary)  -     Stress Test With Myocardial Perfusion One Day; Future  -     Adult Transthoracic Echo Complete w/ Color, Spectral and Contrast if Necessary Per Protocol; Future  -     CBC & Differential  -     Comprehensive Metabolic Panel  -     TSH  -     T4, Free; Future    2. Familial hypercholesterolemia  -     Stress Test With Myocardial Perfusion One Day; Future  -     Adult Transthoracic Echo Complete w/ Color, Spectral and Contrast if Necessary Per Protocol; Future    3. Chest pressure  -     Stress Test With Myocardial Perfusion One Day; Future  -     Adult Transthoracic Echo Complete w/ Color, Spectral and Contrast if Necessary Per Protocol; Future  -     CBC & Differential  -     Comprehensive Metabolic Panel  -     TSH  -     T4, Free; Future  -     Hemoglobin A1c    4. Chronic fatigue  -     CBC & Differential  -     Comprehensive Metabolic Panel  -     TSH  -     T4, Free; Future  -     Hemoglobin A1c        Patient's Body mass index is 27.37 kg/m². indicating that he is overweight (BMI 25-29.9). Obesity-related health conditions include the following: hypertension and dyslipidemias. Obesity is unchanged. BMI is is above average; BMI management plan is completed. We discussed portion control and increasing exercise..      Benito Bryson  reports  that he has never smoked. He has never used smokeless tobacco.           Marta Puente MD  7/29/2021  18:19 CDT

## 2021-07-30 ENCOUNTER — TELEPHONE (OUTPATIENT)
Dept: CARDIOLOGY | Facility: CLINIC | Age: 66
End: 2021-07-30

## 2021-07-30 LAB
QT INTERVAL: 396 MS
QTC INTERVAL: 388 MS

## 2021-08-01 PROCEDURE — 87635 SARS-COV-2 COVID-19 AMP PRB: CPT | Performed by: NURSE PRACTITIONER

## 2021-08-02 ENCOUNTER — LAB (OUTPATIENT)
Dept: LAB | Facility: HOSPITAL | Age: 66
End: 2021-08-02

## 2021-08-02 DIAGNOSIS — E78.01 FAMILIAL HYPERCHOLESTEROLEMIA: Primary | ICD-10-CM

## 2021-08-02 LAB
HOLD SPECIMEN: NORMAL
HOLD SPECIMEN: NORMAL

## 2021-08-02 PROCEDURE — 36415 COLL VENOUS BLD VENIPUNCTURE: CPT

## 2021-08-02 PROCEDURE — 86618 LYME DISEASE ANTIBODY: CPT | Performed by: NURSE PRACTITIONER

## 2021-08-02 PROCEDURE — 87798 DETECT AGENT NOS DNA AMP: CPT | Performed by: NURSE PRACTITIONER

## 2021-08-02 PROCEDURE — 86757 RICKETTSIA ANTIBODY: CPT | Performed by: NURSE PRACTITIONER

## 2021-08-05 PROBLEM — M10.072 ACUTE IDIOPATHIC GOUT OF LEFT FOOT: Status: ACTIVE | Noted: 2021-08-05

## 2021-08-05 PROBLEM — M19.072 ARTHRITIS OF LEFT FOOT: Status: ACTIVE | Noted: 2021-08-05

## 2021-08-09 ENCOUNTER — TELEPHONE (OUTPATIENT)
Dept: FAMILY MEDICINE CLINIC | Facility: CLINIC | Age: 66
End: 2021-08-09

## 2021-08-09 ENCOUNTER — APPOINTMENT (OUTPATIENT)
Dept: CARDIOLOGY | Facility: HOSPITAL | Age: 66
End: 2021-08-09

## 2021-08-09 ENCOUNTER — APPOINTMENT (OUTPATIENT)
Dept: NUCLEAR MEDICINE | Facility: HOSPITAL | Age: 66
End: 2021-08-09

## 2021-08-09 ENCOUNTER — TRANSCRIBE ORDERS (OUTPATIENT)
Dept: URGENT CARE | Facility: CLINIC | Age: 66
End: 2021-08-09

## 2021-08-09 DIAGNOSIS — A77.0 ROCKY MOUNTAIN SPOTTED FEVER: ICD-10-CM

## 2021-08-09 DIAGNOSIS — R53.83 MALAISE AND FATIGUE: Primary | ICD-10-CM

## 2021-08-09 DIAGNOSIS — R53.81 MALAISE AND FATIGUE: Primary | ICD-10-CM

## 2021-08-09 RX ORDER — DOXYCYCLINE 100 MG/1
100 CAPSULE ORAL 2 TIMES DAILY
Qty: 20 CAPSULE | Refills: 0 | Status: SHIPPED | OUTPATIENT
Start: 2021-08-09 | End: 2021-09-02

## 2021-08-09 NOTE — TELEPHONE ENCOUNTER
Pt of Dr. Corrales (provider on vacation) --- Pt was diagnosed with marilia mountain spotted fever on 08/01/21. Pt wife is asking if there is a medicine that pt can be given to help pt, he uses [facility]. Thanks!

## 2021-08-30 DIAGNOSIS — I10 ESSENTIAL HYPERTENSION: Chronic | ICD-10-CM

## 2021-08-30 RX ORDER — ASPIRIN 81 MG/1
TABLET ORAL
Qty: 90 TABLET | Refills: 3 | Status: SHIPPED | OUTPATIENT
Start: 2021-08-30 | End: 2022-08-30

## 2021-08-31 PROBLEM — R20.2 NUMBNESS AND TINGLING OF LEFT THUMB: Status: RESOLVED | Noted: 2021-05-27 | Resolved: 2021-08-31

## 2021-08-31 PROBLEM — R20.0 NUMBNESS AND TINGLING OF LEFT THUMB: Status: RESOLVED | Noted: 2021-05-27 | Resolved: 2021-08-31

## 2021-08-31 PROBLEM — R07.89 CHEST PRESSURE: Status: RESOLVED | Noted: 2021-07-29 | Resolved: 2021-08-31

## 2021-08-31 PROBLEM — R53.82 CHRONIC FATIGUE: Status: RESOLVED | Noted: 2021-07-29 | Resolved: 2021-08-31

## 2021-08-31 PROBLEM — M10.072 ACUTE IDIOPATHIC GOUT OF LEFT FOOT: Status: RESOLVED | Noted: 2021-08-05 | Resolved: 2021-08-31

## 2021-09-02 ENCOUNTER — OFFICE VISIT (OUTPATIENT)
Dept: FAMILY MEDICINE CLINIC | Facility: CLINIC | Age: 66
End: 2021-09-02

## 2021-09-02 VITALS
HEIGHT: 72 IN | SYSTOLIC BLOOD PRESSURE: 122 MMHG | WEIGHT: 195.8 LBS | BODY MASS INDEX: 26.52 KG/M2 | DIASTOLIC BLOOD PRESSURE: 82 MMHG

## 2021-09-02 DIAGNOSIS — I10 ESSENTIAL HYPERTENSION: Primary | Chronic | ICD-10-CM

## 2021-09-02 DIAGNOSIS — L57.8 SUN-DAMAGED SKIN: Chronic | ICD-10-CM

## 2021-09-02 DIAGNOSIS — E78.01 FAMILIAL HYPERCHOLESTEROLEMIA: Chronic | ICD-10-CM

## 2021-09-02 DIAGNOSIS — Z23 NEED FOR VACCINATION: ICD-10-CM

## 2021-09-02 PROCEDURE — 99214 OFFICE O/P EST MOD 30 MIN: CPT | Performed by: FAMILY MEDICINE

## 2021-09-02 PROCEDURE — 90471 IMMUNIZATION ADMIN: CPT | Performed by: FAMILY MEDICINE

## 2021-09-02 PROCEDURE — 90732 PPSV23 VACC 2 YRS+ SUBQ/IM: CPT | Performed by: FAMILY MEDICINE

## 2021-09-02 RX ORDER — ROSUVASTATIN CALCIUM 10 MG/1
10 TABLET, COATED ORAL DAILY
Qty: 90 TABLET | Refills: 3 | Status: SHIPPED | OUTPATIENT
Start: 2021-09-02 | End: 2022-02-28

## 2021-09-02 NOTE — PROGRESS NOTES
Subjective   Benito Bryson is a 66 y.o. male.  Reevaluation hypertension hyperlipidemia history of sun damage skin osteoarthritis.  In interim is lost some weight diet exercise.  Had low element of dehydration earlier in the summer.  Had 1 may been a gout attack but may be related to dehydration.  Lab work done earlier in the year was within normal limits.  Is now only taking half of a cholesterol pill a day with good result.  Is still having some sun damage skin with counseled on same.  Colonoscopy not due till next year.  Does need to start pneumonia series turned 66 but still working every day and not on Medicare yet.  History noted.    History of Present Illness   HPI    The following portions of the patient's history were reviewed and updated as appropriate: allergies, current medications, past family history, past medical history, past social history, past surgical history and problem list.    Review of Systems  Review of Systems   Constitutional: Negative for activity change, appetite change, fatigue and unexpected weight change.   HENT: Negative for trouble swallowing and voice change.    Eyes: Negative for redness and visual disturbance.   Respiratory: Negative for cough and wheezing.    Cardiovascular: Negative for chest pain and palpitations.   Gastrointestinal: Negative for abdominal pain, constipation, diarrhea, nausea and vomiting.   Genitourinary: Negative for urgency.   Musculoskeletal: Negative for joint swelling.   Neurological: Negative for syncope and headaches.   Hematological: Negative for adenopathy.   Psychiatric/Behavioral: Negative for sleep disturbance.       Objective   Physical Exam  Physical Exam  Constitutional:       Appearance: He is well-developed.   HENT:      Head: Normocephalic.      Right Ear: External ear normal.      Nose: Nose normal.   Eyes:      Pupils: Pupils are equal, round, and reactive to light.   Neck:      Thyroid: No thyromegaly.   Cardiovascular:      Rate  "and Rhythm: Normal rate and regular rhythm.      Heart sounds: Normal heart sounds. No murmur heard.   No friction rub. No gallop.    Pulmonary:      Breath sounds: Normal breath sounds.   Abdominal:      General: There is no distension.      Palpations: Abdomen is soft. There is no mass.      Tenderness: There is no abdominal tenderness.   Musculoskeletal:         General: Normal range of motion.      Cervical back: Normal range of motion.   Skin:     General: Skin is warm and dry.      Comments: Sun-damaged skin mild on to go no new lesions   Neurological:      Mental Status: He is alert and oriented to person, place, and time.      Deep Tendon Reflexes: Reflexes are normal and symmetric.           Visit Vitals  /82   Ht 182.9 cm (72\")   Wt 88.8 kg (195 lb 12.8 oz)   BMI 26.56 kg/m²     Body mass index is 26.56 kg/m².  /82   Ht 182.9 cm (72\")   Wt 88.8 kg (195 lb 12.8 oz)   BMI 26.56 kg/m²       Assessment/Plan   Diagnoses and all orders for this visit:    1. Essential hypertension (Primary)    2. Familial hypercholesterolemia  -     rosuvastatin (Crestor) 10 MG tablet; Take 1 tablet by mouth Daily. Taking just half tab day  Dispense: 90 tablet; Refill: 3    3. Sun-damaged skin    4. Need for vaccination  -     Pneumococcal Polysaccharide Vaccine 23-Valent Greater Than or Equal To 3yo Subcutaneous / IM    Counseled mainly on lifestyle measures Covid booster high-dose flu vaccine in the fall start pneumonia series.  Last lab work acceptable.  Counseled on lifestyle measures recheck 6 months will be time for repeat lab work including PSA lipid etc..  Also be time for second pneumonia vaccine.  "

## 2022-02-26 DIAGNOSIS — E78.01 FAMILIAL HYPERCHOLESTEROLEMIA: Chronic | ICD-10-CM

## 2022-02-28 RX ORDER — ROSUVASTATIN CALCIUM 10 MG/1
TABLET, COATED ORAL
Qty: 90 TABLET | Refills: 3 | Status: SHIPPED | OUTPATIENT
Start: 2022-02-28 | End: 2023-02-24

## 2022-03-01 DIAGNOSIS — I10 ESSENTIAL HYPERTENSION: Chronic | ICD-10-CM

## 2022-03-01 RX ORDER — LISINOPRIL 10 MG/1
TABLET ORAL
Qty: 90 TABLET | Refills: 3 | Status: SHIPPED | OUTPATIENT
Start: 2022-03-01 | End: 2023-02-24

## 2022-06-27 ENCOUNTER — OFFICE VISIT (OUTPATIENT)
Dept: SURGERY | Facility: CLINIC | Age: 67
End: 2022-06-27

## 2022-06-27 VITALS
HEIGHT: 72 IN | DIASTOLIC BLOOD PRESSURE: 70 MMHG | HEART RATE: 69 BPM | TEMPERATURE: 97.4 F | BODY MASS INDEX: 26.82 KG/M2 | WEIGHT: 198 LBS | SYSTOLIC BLOOD PRESSURE: 114 MMHG

## 2022-06-27 DIAGNOSIS — L98.9 SKIN LESION: Primary | ICD-10-CM

## 2022-06-27 PROCEDURE — 99213 OFFICE O/P EST LOW 20 MIN: CPT | Performed by: NURSE PRACTITIONER

## 2022-06-27 PROCEDURE — 11104 PUNCH BX SKIN SINGLE LESION: CPT | Performed by: NURSE PRACTITIONER

## 2022-06-27 PROCEDURE — 17110 DESTRUCTION B9 LES UP TO 14: CPT | Performed by: NURSE PRACTITIONER

## 2022-06-27 NOTE — PROGRESS NOTES
"Chief Complaint  Skin Lesion (Skin Lesion on Right Leg)    Subjective        Benito Bryson presents to [facility]  History of Present Illness  Mr. Bryson is a 67 year old patient who presents today for concerns of skin lesion on right lower leg. He states he has had this skin lesion on right lower leg for approximately 8 months.  He denies any pain from the lesions but claims it does itch occasionally.  States that he occasionally will scratch it off and it will return.  He also has noticed a new skin lesion on left foot.  He states this has only been there for couple weeks.  He states it is slightly raised and darker than surrounding skin.  Denies any drainage or itching.  He does not take any anticoagulant medications. He does have history of skin cancer past.      Objective   Vital Signs:  /70   Pulse 69   Temp 97.4 °F (36.3 °C) (Temporal)   Ht 182.9 cm (72\")   Wt 89.8 kg (198 lb)   BMI 26.85 kg/m²   Estimated body mass index is 26.85 kg/m² as calculated from the following:    Height as of this encounter: 182.9 cm (72\").    Weight as of this encounter: 89.8 kg (198 lb).          Physical Exam  Vitals reviewed.   Constitutional:       General: He is not in acute distress.     Appearance: Normal appearance. He is not ill-appearing, toxic-appearing or diaphoretic.   Cardiovascular:      Rate and Rhythm: Normal rate.   Pulmonary:      Effort: Pulmonary effort is normal. No respiratory distress.   Skin:     General: Skin is warm and dry.      Findings: Lesion present.          Neurological:      General: No focal deficit present.      Mental Status: He is alert and oriented to person, place, and time.   Psychiatric:         Mood and Affect: Mood normal.         Behavior: Behavior normal.         Thought Content: Thought content normal.         Judgment: Judgment normal.        Consent obtained. After prepping the skin with alcohol and locally anesthetizing " the area with 3 cc of 2% xylocaine with epinephrine, a punch biopsy performed. Hemostasis is accomplished with gelfoam and pressure. Tolerated well.      Result Review :                Assessment and Plan   Diagnoses and all orders for this visit:    1. Skin lesion (Primary)  -     TISSUE EXAM, [facility] ([facility])           I spent 30 minutes caring for Benito on this date of service. This time includes time spent by me in the following activities:preparing for the visit, obtaining and/or reviewing a separately obtained history, performing a medically appropriate examination and/or evaluation , ordering medications, tests, or procedures, documenting information in the medical record and care coordination     Follow Up   Return if symptoms worsen or fail to improve.  May clean biopsy site in shower in 24 hours.  Will notify of pathology results when available and arrange for further intervention as necessary.  He is aware of any signs and symptoms that would require further medical evaluation.      Patient was given instructions and counseling regarding his condition or for health maintenance advice. Please see specific information pulled into the AVS if appropriate.                 This document has been electronically signed by BART Guido on June 27, 2022 14:14 CDT

## 2022-06-30 ENCOUNTER — TELEPHONE (OUTPATIENT)
Dept: SURGERY | Facility: CLINIC | Age: 67
End: 2022-06-30

## 2022-06-30 LAB — REF LAB TEST METHOD: NORMAL

## 2022-06-30 NOTE — TELEPHONE ENCOUNTER
Notified patient of pathology results via telephone. He denies any concerns with biopsy site or cryotherapy site.  Will have office call him to schedule in office excision of lesion.  He verbalizes understanding and denies any additional questions or concerns.

## 2022-07-13 ENCOUNTER — PROCEDURE VISIT (OUTPATIENT)
Dept: SURGERY | Facility: CLINIC | Age: 67
End: 2022-07-13

## 2022-07-13 VITALS
HEART RATE: 60 BPM | SYSTOLIC BLOOD PRESSURE: 120 MMHG | WEIGHT: 195.4 LBS | TEMPERATURE: 97 F | HEIGHT: 72 IN | BODY MASS INDEX: 26.47 KG/M2 | DIASTOLIC BLOOD PRESSURE: 84 MMHG

## 2022-07-13 DIAGNOSIS — C44.92 CANCER OF SKIN, SQUAMOUS CELL: Primary | ICD-10-CM

## 2022-07-13 PROCEDURE — 17000 DESTRUCT PREMALG LESION: CPT | Performed by: NURSE PRACTITIONER

## 2022-07-13 PROCEDURE — 11602 EXC TR-EXT MAL+MARG 1.1-2 CM: CPT | Performed by: NURSE PRACTITIONER

## 2022-07-13 NOTE — PROGRESS NOTES
"Chief Complaint  minor surgery    Subjective        Benito Bryson presents to [facility]  History of Present Illness  Mr. 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  He denies taking any anticoagulant medications. He also has an additional concerning lesion on his scalp he states he noticed several weeks ago.  He denies pain or itching from this area.            Objective   Vital Signs:  /84   Pulse 60   Temp 97 °F (36.1 °C) (Temporal)   Ht 182.9 cm (72\")   Wt 88.6 kg (195 lb 6.4 oz)   BMI 26.50 kg/m²   Estimated body mass index is 26.5 kg/m² as calculated from the following:    Height as of this encounter: 182.9 cm (72\").    Weight as of this encounter: 88.6 kg (195 lb 6.4 oz).          Physical Exam  Vitals reviewed.   Constitutional:       General: He is not in acute distress.     Appearance: Normal appearance. He is not ill-appearing, toxic-appearing or diaphoretic.   HENT:      Head:     Cardiovascular:      Rate and Rhythm: Normal rate.   Pulmonary:      Effort: Pulmonary effort is normal. No respiratory distress.   Skin:     General: Skin is warm and dry.      Findings: Lesion present.          Neurological:      General: No focal deficit present.      Mental Status: He is alert and oriented to person, place, and time.   Psychiatric:         Mood and Affect: Mood normal.         Behavior: Behavior normal.         Thought Content: Thought content normal.         Judgment: Judgment normal.     Procedure     Procedure Performed: Excision of skin cancer from right lower leg requiring 2cm incision length   Preop Dx: skin cancer  Postop Dx: Same  Assistant: Melody Weems MA  EBL: <5cc  Specimen: Skin Lesion    Note: Site was identified by the patient.  Consent was obtained.  Timeout was performed.  The area was prepped and draped in normal sterile fashion.  Local anesthetic was then injected in a field " block.  Elliptical skin incision was made fully encompassing the skin lesion.  Full-thickness skin was then removed.  Hemostasis was achieved.  The skin was then closed.  Sterile dressings were placed.              Result Review :                Assessment and Plan   Diagnoses and all orders for this visit:    1. Cancer of skin, squamous cell (Primary)  -     TISSUE EXAM, [facility] ([facility])           I spent 35 minutes caring for Beinto on this date of service. This time includes time spent by me in the following activities:preparing for the visit, reviewing tests, obtaining and/or reviewing a separately obtained history, performing a medically appropriate examination and/or evaluation , referring and communicating with other health care professionals , documenting information in the medical record and care coordination  Follow Up   Return in about 10 days (around 7/23/2022), or if symptoms worsen or fail to improve.     May gently clean incision site in shower beginning in 24 hours. Apply clean dry dressing as needed. He is aware of signs/symptoms that would require urgent medical evaluation. He is instructed to elevate extremity when possible and minimize excessive ambulation today.         Patient was given instructions and counseling regarding his condition or for health maintenance advice. Please see specific information pulled into the AVS if appropriate.                 This document has been electronically signed by BART Guido on July 13, 2022 12:44 CDT

## 2022-07-15 LAB — REF LAB TEST METHOD: NORMAL

## 2022-07-22 ENCOUNTER — OFFICE VISIT (OUTPATIENT)
Dept: SURGERY | Facility: CLINIC | Age: 67
End: 2022-07-22

## 2022-07-22 VITALS
SYSTOLIC BLOOD PRESSURE: 110 MMHG | BODY MASS INDEX: 26.41 KG/M2 | WEIGHT: 195 LBS | HEIGHT: 72 IN | HEART RATE: 59 BPM | TEMPERATURE: 97.4 F | DIASTOLIC BLOOD PRESSURE: 76 MMHG | OXYGEN SATURATION: 97 %

## 2022-07-22 DIAGNOSIS — C44.92 CANCER OF SKIN, SQUAMOUS CELL: Primary | ICD-10-CM

## 2022-07-22 PROCEDURE — 99024 POSTOP FOLLOW-UP VISIT: CPT | Performed by: NURSE PRACTITIONER

## 2022-07-22 NOTE — PROGRESS NOTES
CHIEF COMPLAINT:   Chief Complaint   Patient presents with   • Post-op Follow-up     Follow up office procedure, stitches R leg       HPI: This patient presents for a post-operative visit after undergoing excision of skin lesion from right lower leg and liquid nitrogen treatment of head. Doing well- no cellulitis, wound separation, or significant pain.    PATHOLOGY:       Physical Exam  Vitals reviewed.   Constitutional:       General: He is not in acute distress.     Appearance: Normal appearance. He is not ill-appearing, toxic-appearing or diaphoretic.   HENT:      Head:     Cardiovascular:      Rate and Rhythm: Normal rate.   Pulmonary:      Effort: Pulmonary effort is normal. No respiratory distress.   Skin:     General: Skin is warm and dry.      Findings: Lesion present.          Neurological:      General: No focal deficit present.      Mental Status: He is alert and oriented to person, place, and time.   Psychiatric:         Mood and Affect: Mood normal.         Behavior: Behavior normal.         Thought Content: Thought content normal.         Judgment: Judgment normal.         ASSESSMENT:    Diagnoses and all orders for this visit:    1. Cancer of skin, squamous cell (Primary)        PLAN:  1. The patient will follow-up as needed.   2. May return to normal activity without restrictions.  3. Patient has previously been educated on use of SPF and protective clothing when outdoors to minimize sun damage and risk of skin cancer.                    This document has been electronically signed by BART Guido on July 22, 2022 11:13 CDT

## 2022-08-01 ENCOUNTER — OFFICE VISIT (OUTPATIENT)
Dept: ORTHOPEDIC SURGERY | Facility: CLINIC | Age: 67
End: 2022-08-01

## 2022-08-01 VITALS — BODY MASS INDEX: 26.45 KG/M2 | HEIGHT: 72 IN

## 2022-08-01 DIAGNOSIS — M25.552 LEFT HIP PAIN: Primary | ICD-10-CM

## 2022-08-01 DIAGNOSIS — M25.551 BILATERAL HIP PAIN: Primary | ICD-10-CM

## 2022-08-01 DIAGNOSIS — M16.12 PRIMARY OSTEOARTHRITIS OF LEFT HIP: ICD-10-CM

## 2022-08-01 DIAGNOSIS — M25.552 BILATERAL HIP PAIN: Primary | ICD-10-CM

## 2022-08-01 PROCEDURE — 96372 THER/PROPH/DIAG INJ SC/IM: CPT | Performed by: NURSE PRACTITIONER

## 2022-08-01 PROCEDURE — 99214 OFFICE O/P EST MOD 30 MIN: CPT | Performed by: NURSE PRACTITIONER

## 2022-08-01 RX ORDER — MELOXICAM 15 MG/1
15 TABLET ORAL DAILY
Qty: 30 TABLET | Refills: 3 | Status: SHIPPED | OUTPATIENT
Start: 2022-08-01 | End: 2022-08-31

## 2022-08-01 RX ORDER — TRIAMCINOLONE ACETONIDE 40 MG/ML
80 INJECTION, SUSPENSION INTRA-ARTICULAR; INTRAMUSCULAR ONCE
Status: COMPLETED | OUTPATIENT
Start: 2022-08-01 | End: 2022-08-01

## 2022-08-01 RX ADMIN — TRIAMCINOLONE ACETONIDE 80 MG: 40 INJECTION, SUSPENSION INTRA-ARTICULAR; INTRAMUSCULAR at 14:21

## 2022-08-01 NOTE — PROGRESS NOTES
"  Benito Bryson is a 67 y.o. male   Primary provider:  Gerry Corrales MD       Chief Complaint   Patient presents with   • Left Hip - Initial Evaluation, Pain     HISTORY OF PRESENT ILLNESS:    67-year-old male patient presents to office for evaluation of chronic left hip pain.  Initial onset of pain occurred approximately 4 months ago with no known injury or incident.  Patient localizes the pain to the anterior aspect of his left hip as well as his groin.  He has pain that radiates into the left thigh.  Patient has been walking for exercise and experiences increased pain after walking.  Pain is described as intermittent and moderate in severity.  Pain is described as aching in nature.  Patient also states he has an occasional \"catching\" type pain when he starts to ambulate and he localizes this to the anterior hip in the left iliac crest.  Pain is worse with longer periods of weightbearing activity, standing and walking.  Pain improves temporarily with rest.  X-rays of the left hip/pelvis are performed in office today.  Pain scale currently is 5/10.    CONCURRENT MEDICAL HISTORY:    Past Medical History:   Diagnosis Date   • Astigmatism     hyperopic   • Bradycardia    • Contusion of cerebrum (HCC)     history of   • Hyperlipidemia    • Hypertensive disorder        No Known Allergies      Current Outpatient Medications:   •  Aspirin Adult Low Strength 81 MG EC tablet, TAKE 1 TABLET BY MOUTH DAILY., Disp: 90 tablet, Rfl: 3  •  lisinopril (PRINIVIL,ZESTRIL) 10 MG tablet, TAKE 1 TABLET BY MOUTH EVERY DAY, Disp: 90 tablet, Rfl: 3  •  rosuvastatin (CRESTOR) 10 MG tablet, TAKE 1 TABLET BY MOUTH EVERY DAY (Patient taking differently: Take 5 mg by mouth Daily.), Disp: 90 tablet, Rfl: 3  •  meloxicam (Mobic) 15 MG tablet, Take 1 tablet by mouth Daily for 30 days., Disp: 30 tablet, Rfl: 3    Past Surgical History:   Procedure Laterality Date   • COLONOSCOPY  12/20/2012    Colonoscopy, diagnostic (screening) 56610 (1)  " "  Diverticulum in sigmoid colon. Internal & external hemorrhoids found.    • ENDOSCOPY  12/20/2012    EGD w/ tube 40630 (1)    Ulcer in middle 3rd of esophagus. Biopsy taken. Esophagitis seen in distal 3rd. of esophagus. Biopsy taken. Gastritis in stomach. Biopsy taken. Normal duodenum.    • EXCISION LESION  03/27/2014    Destruction of Premalignant Lesion (1st) 45310 (1)      • EXCISION LESION  03/21/2013    Shave Skin Lesions Face-MM 0.6 - 1 CM 81797 (1)      • OTHER SURGICAL HISTORY  12/05/2012    Avulsion of Nail Plate 69549 (1)   • SKIN BIOPSY  12/19/2011    Biopsy of Skin 95074 (2)      • VASECTOMY  12/28/1995    Vasectomy - ligate (1)    Elective        Family History   Problem Relation Age of Onset   • Diabetes Mother    • Alzheimer's disease Maternal Grandmother    • Osteoarthritis Other    • Vision loss Other        Social History     Socioeconomic History   • Marital status:    Tobacco Use   • Smoking status: Never Smoker   • Smokeless tobacco: Never Used   Substance and Sexual Activity   • Alcohol use: Yes     Comment: social   • Drug use: No        Review of Systems   Constitutional: Negative.    HENT: Negative.    Eyes: Negative.    Respiratory: Negative.    Cardiovascular: Negative.    Gastrointestinal: Negative.    Endocrine: Negative.    Genitourinary: Negative.    Musculoskeletal: Positive for arthralgias and gait problem.        Left hip pain.    Skin: Negative.    Allergic/Immunologic: Negative.    Hematological: Negative.    Psychiatric/Behavioral: Negative.        PHYSICAL EXAMINATION:       Ht 182.9 cm (72\")   BMI 26.45 kg/m²     Physical Exam  Vitals reviewed.   Constitutional:       General: He is not in acute distress.     Appearance: He is well-developed. He is not ill-appearing.   HENT:      Head: Normocephalic.   Pulmonary:      Effort: Pulmonary effort is normal. No respiratory distress.   Abdominal:      General: There is no distension.      Palpations: Abdomen is soft. "   Musculoskeletal:         General: No swelling, tenderness, deformity or signs of injury.   Skin:     General: Skin is warm and dry.      Capillary Refill: Capillary refill takes less than 2 seconds.      Findings: No erythema.   Neurological:      Mental Status: He is alert and oriented to person, place, and time.      GCS: GCS eye subscore is 4. GCS verbal subscore is 5. GCS motor subscore is 6.      Sensory: No sensory deficit.   Psychiatric:         Speech: Speech normal.         Behavior: Behavior normal.         Thought Content: Thought content normal.         Judgment: Judgment normal.         GAIT:     []  Normal  [x]  Antalgic    Assistive device: [x]  None  []  Walker     []  Crutches  []  Cane     []  Wheelchair  []  Stretcher    Left Hip Exam     Tenderness   The patient is experiencing no tenderness.     Range of Motion   Abduction: 35   Flexion: 90   External rotation: 50   Internal rotation: 0     Muscle Strength   Abduction: 4/5   Flexion: 4/5     Tests   DANIELLE: positive  Fadir:  Positive FADIR test    Other   Erythema: absent  Sensation: normal  Pulse: present    Comments:  Pain and limitations with range of motion, especially external and internal rotation.  There is slight shortening of the left leg compared to the right.  No tenderness to palpation.  No swelling appreciated.  No visible abnormalities noted.            XR Hips Bilateral With or Without Pelvis 2 View    Result Date: 8/1/2022  Narrative: Two view bilateral hips with single view pelvis HISTORY: Bilateral hip pain AP and frog-leg lateral views of each hip and AP film of the pelvis obtained. COMPARISON: None FINDINGS: No fracture or dislocation. Narrowing of the left hip joint superiorly and medially. Degenerative cystic change left acetabular roof. Minimal degenerative change greater trochanter of each hip. Minimal degenerative changes lumbar spine. No other osseous or articular abnormality. Vasectomy.     Impression: CONCLUSION:  Narrowing of the left hip joint superiorly and medially. Degenerative cystic change left acetabular roof. Minimal degenerative change greater trochanter of each hip. Minimal degenerative changes lumbar spine. [id number] Electronically signed by:  Zach Lal MD  8/1/2022 11:06 PM CDT Workstation: [id number]        ASSESSMENT:    Diagnoses and all orders for this visit:    Left hip pain  -     triamcinolone acetonide (KENALOG-40) injection 80 mg  -     FL Guide For Pain Meds Injection Joint; Future    Primary osteoarthritis of left hip  -     FL Guide For Pain Meds Injection Joint; Future    Other orders  -     meloxicam (Mobic) 15 MG tablet; Take 1 tablet by mouth Daily for 30 days.    PLAN    X-rays of the left hip/pelvis performed in office today and reviewed with no acute findings noted.  Patient has moderate osteoarthritic changes in the left hip joint with joint space narrowing.  There is a notable difference in joint space preservation in the left hip compared to the right.  Patient complains of left hip pain that has been ongoing now for approximately 4 months.  The patient states he has been walking for exercise and has increased pain after walking but not so much during exercise.  Patient describes pain that radiates throughout the left femur.  Subjective complaints and physical exam are most consistent with osteoarthritis of the left hip joint.  We discussed conservative management.  We discussed the possibility of progression of the osteoarthritis and possible need for hip arthroplasty in the future.  We discussed a trial of an intra-articular injection of steroid into the left hip joint, which the patient is agreeable to.  However, he is leaving for a vacation with his wife to [place] on Friday so hopefully we can have the arrangements and injection performed prior to his vacation.  For today, recommend proceeding with an intramuscular injection of Kenalog 80 mg for management of joint  pain/inflammation.    Recommend the following:    -Rest and activity modification as tolerated and based on pain.  -Use of a cane or walker as needed for modified weightbearing off the affected hip.   -Gradual progression of weightbearing and activity as pain allows.   -Ice therapy to the affected hip to minimize pain/inflammation.     Recommend starting a prescription oral NSAID for improved control of pain/inflammation. Meloxicam is prescribed today. Patient is instructed to take the medication daily. Patient is instructed to take the medication with food to minimize any potential GI upset. Patient is instructed not to take any additional NSAIDs, such as Ibuprofen or Aleve, while taking Meloxicam. Patient can also take Tylenol as needed for additional pain control.     Follow-up in 6 weeks for recheck as needed for any new, worsening or persistent symptoms.  We discussed that if he gets good relief with the injections, he can follow-up on an as-needed basis.    Time spent of a minimum of 30 minutes including the face to face evaluation, reviewing of medical history and prior medial records, reviewing of diagnostic studies, ordering additional treatments, prescription drug management, documentation, patient education and coordination of care.     EMR Dragon/[employer] Design[employer]ption Disclaimer: Some of this note may be an electronic transcription/translation of spoken language to printed text using the Dragon Dictation System.     Return in about 6 weeks (around 9/12/2022), or if symptoms worsen or fail to improve, for Recheck.        This document has been electronically signed by BART Edwards on August 2, 2022 17:42 CDT      BART Edwards

## 2022-08-03 ENCOUNTER — HOSPITAL ENCOUNTER (OUTPATIENT)
Dept: INTERVENTIONAL RADIOLOGY/VASCULAR | Facility: HOSPITAL | Age: 67
End: 2022-08-03

## 2022-08-30 DIAGNOSIS — I10 ESSENTIAL HYPERTENSION: Chronic | ICD-10-CM

## 2022-08-30 RX ORDER — ASPIRIN 81 MG/1
TABLET, COATED ORAL
Qty: 90 TABLET | Refills: 3 | Status: SHIPPED | OUTPATIENT
Start: 2022-08-30

## 2022-09-08 ENCOUNTER — LAB (OUTPATIENT)
Dept: LAB | Facility: HOSPITAL | Age: 67
End: 2022-09-08

## 2022-09-08 ENCOUNTER — OFFICE VISIT (OUTPATIENT)
Dept: FAMILY MEDICINE CLINIC | Facility: CLINIC | Age: 67
End: 2022-09-08

## 2022-09-08 VITALS
WEIGHT: 194 LBS | HEIGHT: 72 IN | BODY MASS INDEX: 26.28 KG/M2 | SYSTOLIC BLOOD PRESSURE: 122 MMHG | DIASTOLIC BLOOD PRESSURE: 78 MMHG

## 2022-09-08 DIAGNOSIS — Z00.00 WELLNESS EXAMINATION: ICD-10-CM

## 2022-09-08 DIAGNOSIS — Z12.5 SCREENING PSA (PROSTATE SPECIFIC ANTIGEN): ICD-10-CM

## 2022-09-08 DIAGNOSIS — L57.8 SUN-DAMAGED SKIN: Chronic | ICD-10-CM

## 2022-09-08 DIAGNOSIS — Z23 NEED FOR VACCINATION: ICD-10-CM

## 2022-09-08 DIAGNOSIS — E78.01 FAMILIAL HYPERCHOLESTEROLEMIA: Primary | Chronic | ICD-10-CM

## 2022-09-08 DIAGNOSIS — I10 ESSENTIAL HYPERTENSION: Chronic | ICD-10-CM

## 2022-09-08 PROCEDURE — 80053 COMPREHEN METABOLIC PANEL: CPT | Performed by: FAMILY MEDICINE

## 2022-09-08 PROCEDURE — 90471 IMMUNIZATION ADMIN: CPT | Performed by: FAMILY MEDICINE

## 2022-09-08 PROCEDURE — 36415 COLL VENOUS BLD VENIPUNCTURE: CPT | Performed by: FAMILY MEDICINE

## 2022-09-08 PROCEDURE — 99397 PER PM REEVAL EST PAT 65+ YR: CPT | Performed by: FAMILY MEDICINE

## 2022-09-08 PROCEDURE — G0103 PSA SCREENING: HCPCS | Performed by: FAMILY MEDICINE

## 2022-09-08 PROCEDURE — 83735 ASSAY OF MAGNESIUM: CPT | Performed by: FAMILY MEDICINE

## 2022-09-08 PROCEDURE — 80061 LIPID PANEL: CPT | Performed by: FAMILY MEDICINE

## 2022-09-08 PROCEDURE — 90677 PCV20 VACCINE IM: CPT | Performed by: FAMILY MEDICINE

## 2022-09-08 NOTE — PROGRESS NOTES
Subjective   Benito Bryson is a 67 y.o. male.  Yearly evaluation.  Carries diagnoses of mild hyperlipidemia mild hypertension.  In interim has had a wellness phone call from his insurance company had a Cologuard done about 3 months ago that was negative.  Time for lab work today.  Is already seen dermatology has had 2 skin lesions removed there were skin cancer as mentioned previously.  Does need second pneumonia vaccine.  We will get a shingles vaccine on a Friday when he can rest on a Saturday.  History reviewed.    History of Present Illness   HPI    The following portions of the patient's history were reviewed and updated as appropriate: allergies, current medications, past family history, past medical history, past social history, past surgical history and problem list.    Review of Systems  Review of Systems   Constitutional: Negative for activity change, appetite change, fatigue and unexpected weight change.   HENT: Positive for postnasal drip (Mainly environmental). Negative for trouble swallowing and voice change.    Eyes: Negative for redness and visual disturbance.   Respiratory: Negative for cough and wheezing.    Cardiovascular: Negative for chest pain and palpitations.   Gastrointestinal: Negative for abdominal pain, constipation, diarrhea, nausea and vomiting.   Genitourinary: Negative for urgency.   Musculoskeletal: Positive for arthralgias (Hip pain bilaterally seeing in orthopedics.  Known DJD). Negative for joint swelling.   Skin: Positive for color change (Sun damage sees dermatology).   Neurological: Negative for syncope and headaches.   Hematological: Negative for adenopathy.   Psychiatric/Behavioral: Negative for sleep disturbance.       Objective   Physical Exam  Physical Exam  Constitutional:       Appearance: He is well-developed.   HENT:      Head: Normocephalic.   Eyes:      Pupils: Pupils are equal, round, and reactive to light.   Neck:      Thyroid: No thyromegaly.  "  Cardiovascular:      Rate and Rhythm: Normal rate and regular rhythm.      Heart sounds: Normal heart sounds. No murmur heard.    No friction rub. No gallop.   Pulmonary:      Breath sounds: Normal breath sounds.   Abdominal:      General: There is no distension.      Palpations: Abdomen is soft. There is no mass.      Tenderness: There is no abdominal tenderness.   Musculoskeletal:         General: Normal range of motion.      Cervical back: Normal range of motion.      Comments: Get up and go 3 to 5 seconds gait normal   Skin:     General: Skin is warm and dry.   Neurological:      Mental Status: He is alert and oriented to person, place, and time.      Deep Tendon Reflexes: Reflexes are normal and symmetric.   Psychiatric:         Attention and Perception: Attention normal.         Speech: Speech normal.         Behavior: Behavior normal.         Thought Content: Thought content normal.         Cognition and Memory: Cognition normal.           Visit Vitals  /78   Ht 182.9 cm (72\")   Wt 88 kg (194 lb)   BMI 26.31 kg/m²     Body mass index is 26.31 kg/m².  /78   Ht 182.9 cm (72\")   Wt 88 kg (194 lb)   BMI 26.31 kg/m²       Assessment/Plan   Diagnoses and all orders for this visit:    1. Familial hypercholesterolemia (Primary)  -     Lipid Panel With LDL / HDL Ratio    2. Essential hypertension  -     Comprehensive Metabolic Panel  -     Magnesium    3. Screening PSA (prostate specific antigen)  -     PSA Screen    4. Need for vaccination  -     Pneumococcal Conjugate Vaccine 20-Valent All    5. Sun-damaged skin    6. Wellness examination    Orders as above counseled lifestyle measures as before recheck yearly  "

## 2022-09-09 LAB
ALBUMIN SERPL-MCNC: 3.9 G/DL (ref 3.5–5.2)
ALBUMIN/GLOB SERPL: 1.3 G/DL
ALP SERPL-CCNC: 65 U/L (ref 39–117)
ALT SERPL W P-5'-P-CCNC: 28 U/L (ref 1–41)
ANION GAP SERPL CALCULATED.3IONS-SCNC: 11.4 MMOL/L (ref 5–15)
AST SERPL-CCNC: 22 U/L (ref 1–40)
BILIRUB SERPL-MCNC: 0.3 MG/DL (ref 0–1.2)
BUN SERPL-MCNC: 15 MG/DL (ref 8–23)
BUN/CREAT SERPL: 14.4 (ref 7–25)
CALCIUM SPEC-SCNC: 9.2 MG/DL (ref 8.6–10.5)
CHLORIDE SERPL-SCNC: 104 MMOL/L (ref 98–107)
CHOLEST SERPL-MCNC: 151 MG/DL (ref 0–200)
CO2 SERPL-SCNC: 22.6 MMOL/L (ref 22–29)
CREAT SERPL-MCNC: 1.04 MG/DL (ref 0.76–1.27)
EGFRCR SERPLBLD CKD-EPI 2021: 79.2 ML/MIN/1.73
GLOBULIN UR ELPH-MCNC: 3.1 GM/DL
GLUCOSE SERPL-MCNC: 136 MG/DL (ref 65–99)
HDLC SERPL-MCNC: 49 MG/DL (ref 40–60)
LDLC SERPL CALC-MCNC: 86 MG/DL (ref 0–100)
LDLC/HDLC SERPL: 1.75 {RATIO}
MAGNESIUM SERPL-MCNC: 2.1 MG/DL (ref 1.6–2.4)
POTASSIUM SERPL-SCNC: 4.2 MMOL/L (ref 3.5–5.2)
PROT SERPL-MCNC: 7 G/DL (ref 6–8.5)
PSA SERPL-MCNC: 1.26 NG/ML (ref 0–4)
SODIUM SERPL-SCNC: 138 MMOL/L (ref 136–145)
TRIGL SERPL-MCNC: 81 MG/DL (ref 0–150)
VLDLC SERPL-MCNC: 16 MG/DL (ref 5–40)

## 2022-10-10 RX ORDER — MELOXICAM 15 MG/1
15 TABLET ORAL DAILY
Qty: 90 TABLET | Refills: 1 | Status: SHIPPED | OUTPATIENT
Start: 2022-10-10 | End: 2022-11-09

## 2023-02-24 DIAGNOSIS — E78.01 FAMILIAL HYPERCHOLESTEROLEMIA: Chronic | ICD-10-CM

## 2023-02-24 DIAGNOSIS — I10 ESSENTIAL HYPERTENSION: Chronic | ICD-10-CM

## 2023-02-24 RX ORDER — ROSUVASTATIN CALCIUM 10 MG/1
TABLET, COATED ORAL
Qty: 90 TABLET | Refills: 3 | Status: SHIPPED | OUTPATIENT
Start: 2023-02-24

## 2023-02-24 RX ORDER — LISINOPRIL 10 MG/1
TABLET ORAL
Qty: 90 TABLET | Refills: 3 | Status: SHIPPED | OUTPATIENT
Start: 2023-02-24

## 2023-08-24 DIAGNOSIS — I10 ESSENTIAL HYPERTENSION: Chronic | ICD-10-CM

## 2023-08-24 RX ORDER — ASPIRIN 81 MG/1
TABLET, COATED ORAL
Qty: 90 TABLET | Refills: 3 | OUTPATIENT
Start: 2023-08-24

## 2023-09-05 ENCOUNTER — OFFICE VISIT (OUTPATIENT)
Dept: FAMILY MEDICINE CLINIC | Facility: CLINIC | Age: 68
End: 2023-09-05
Payer: COMMERCIAL

## 2023-09-05 ENCOUNTER — LAB (OUTPATIENT)
Dept: LAB | Facility: HOSPITAL | Age: 68
End: 2023-09-05
Payer: COMMERCIAL

## 2023-09-05 VITALS
BODY MASS INDEX: 26.13 KG/M2 | DIASTOLIC BLOOD PRESSURE: 76 MMHG | SYSTOLIC BLOOD PRESSURE: 118 MMHG | HEIGHT: 72 IN | WEIGHT: 192.9 LBS

## 2023-09-05 DIAGNOSIS — I10 ESSENTIAL HYPERTENSION: Primary | Chronic | ICD-10-CM

## 2023-09-05 DIAGNOSIS — N40.1 BENIGN PROSTATIC HYPERPLASIA WITH POST-VOID DRIBBLING: ICD-10-CM

## 2023-09-05 DIAGNOSIS — N39.43 BENIGN PROSTATIC HYPERPLASIA WITH POST-VOID DRIBBLING: ICD-10-CM

## 2023-09-05 DIAGNOSIS — Z12.5 SCREENING PSA (PROSTATE SPECIFIC ANTIGEN): ICD-10-CM

## 2023-09-05 DIAGNOSIS — E78.01 FAMILIAL HYPERCHOLESTEROLEMIA: Chronic | ICD-10-CM

## 2023-09-05 PROBLEM — M16.12 PRIMARY OSTEOARTHRITIS OF LEFT HIP: Chronic | Status: ACTIVE | Noted: 2022-08-01

## 2023-09-05 PROBLEM — M25.552 LEFT HIP PAIN: Status: RESOLVED | Noted: 2022-08-01 | Resolved: 2023-09-05

## 2023-09-05 PROBLEM — M19.072 ARTHRITIS OF LEFT FOOT: Chronic | Status: ACTIVE | Noted: 2021-08-05

## 2023-09-05 LAB
ALBUMIN SERPL-MCNC: 4.2 G/DL (ref 3.5–5.2)
ALBUMIN/GLOB SERPL: 1.6 G/DL
ALP SERPL-CCNC: 63 U/L (ref 39–117)
ALT SERPL W P-5'-P-CCNC: 21 U/L (ref 1–41)
ANION GAP SERPL CALCULATED.3IONS-SCNC: 11 MMOL/L (ref 5–15)
AST SERPL-CCNC: 28 U/L (ref 1–40)
BILIRUB SERPL-MCNC: 0.4 MG/DL (ref 0–1.2)
BUN SERPL-MCNC: 26 MG/DL (ref 8–23)
BUN/CREAT SERPL: 22.8 (ref 7–25)
CALCIUM SPEC-SCNC: 9.3 MG/DL (ref 8.6–10.5)
CHLORIDE SERPL-SCNC: 104 MMOL/L (ref 98–107)
CHOLEST SERPL-MCNC: 154 MG/DL (ref 0–200)
CO2 SERPL-SCNC: 23 MMOL/L (ref 22–29)
CREAT SERPL-MCNC: 1.14 MG/DL (ref 0.76–1.27)
EGFRCR SERPLBLD CKD-EPI 2021: 70.5 ML/MIN/1.73
GLOBULIN UR ELPH-MCNC: 2.6 GM/DL
GLUCOSE SERPL-MCNC: 101 MG/DL (ref 65–99)
HDLC SERPL-MCNC: 45 MG/DL (ref 40–60)
LDLC SERPL CALC-MCNC: 93 MG/DL (ref 0–100)
LDLC/HDLC SERPL: 2.04 {RATIO}
MAGNESIUM SERPL-MCNC: 2.6 MG/DL (ref 1.6–2.4)
POTASSIUM SERPL-SCNC: 4.7 MMOL/L (ref 3.5–5.2)
PROT SERPL-MCNC: 6.8 G/DL (ref 6–8.5)
PSA SERPL-MCNC: 1.35 NG/ML (ref 0–4)
SODIUM SERPL-SCNC: 138 MMOL/L (ref 136–145)
TRIGL SERPL-MCNC: 87 MG/DL (ref 0–150)
VLDLC SERPL-MCNC: 16 MG/DL (ref 5–40)

## 2023-09-05 PROCEDURE — 80061 LIPID PANEL: CPT | Performed by: FAMILY MEDICINE

## 2023-09-05 PROCEDURE — 36415 COLL VENOUS BLD VENIPUNCTURE: CPT | Performed by: FAMILY MEDICINE

## 2023-09-05 PROCEDURE — 83735 ASSAY OF MAGNESIUM: CPT | Performed by: FAMILY MEDICINE

## 2023-09-05 PROCEDURE — 99214 OFFICE O/P EST MOD 30 MIN: CPT | Performed by: FAMILY MEDICINE

## 2023-09-05 PROCEDURE — G0103 PSA SCREENING: HCPCS | Performed by: FAMILY MEDICINE

## 2023-09-05 PROCEDURE — 80053 COMPREHEN METABOLIC PANEL: CPT | Performed by: FAMILY MEDICINE

## 2023-09-05 RX ORDER — MELOXICAM 15 MG/1
15 TABLET ORAL DAILY
COMMUNITY

## 2023-09-05 NOTE — PROGRESS NOTES
Subjective   Benito Bryson is a 68 y.o. male.  Reevaluation hypertension hyperlipidemia diagnoses below.  In the interim feels well is maintaining weight and blood pressure.  Has developed some mild postvoid dribbling on occasion for probably mild early BPH.  We will recheck a PSA today if negative then will probably low-dose trial of Flomax.  Other medicines have remained the same has visited with orthopedics as outlined.  Not due for cologuard to 2025.  Counseled today also on vaccines in the fall    History of Present Illness   Hypertension  Pertinent negatives include no chest pain, headaches or palpitations.     The following portions of the patient's history were reviewed and updated as appropriate: allergies, current medications, past family history, past medical history, past social history, past surgical history, and problem list.    Review of Systems  Review of Systems   Constitutional:  Negative for activity change, appetite change, fatigue and unexpected weight change.   HENT:  Negative for trouble swallowing and voice change.    Eyes:  Negative for redness and visual disturbance.   Respiratory:  Negative for cough and wheezing.    Cardiovascular:  Negative for chest pain and palpitations.   Gastrointestinal:  Negative for abdominal pain, constipation, diarrhea, nausea and vomiting.   Genitourinary:  Negative for urgency.   Musculoskeletal:  Positive for arthralgias. Negative for joint swelling.   Neurological:  Negative for syncope and headaches.   Hematological:  Negative for adenopathy.   Psychiatric/Behavioral:  Negative for sleep disturbance.      Objective   Physical Exam  Physical Exam  Constitutional:       Appearance: He is well-developed.   HENT:      Head: Normocephalic.   Eyes:      Pupils: Pupils are equal, round, and reactive to light.   Neck:      Thyroid: No thyromegaly.   Cardiovascular:      Rate and Rhythm: Normal rate and regular rhythm.      Heart sounds: Normal heart sounds.  "No murmur heard.    No friction rub. No gallop.   Pulmonary:      Breath sounds: Normal breath sounds.   Abdominal:      General: There is no distension.      Palpations: Abdomen is soft. There is no mass.      Tenderness: There is no abdominal tenderness.   Musculoskeletal:         General: Normal range of motion.      Cervical back: Normal range of motion.   Skin:     General: Skin is warm and dry.      Comments: Houck tanned early damage no new lesions counseled on same   Neurological:      Mental Status: He is alert and oriented to person, place, and time.      Deep Tendon Reflexes: Reflexes are normal and symmetric.   Psychiatric:         Attention and Perception: Attention normal.         Mood and Affect: Mood normal.         Speech: Speech normal.         Behavior: Behavior normal.         Thought Content: Thought content normal.         Cognition and Memory: Cognition normal.         Judgment: Judgment normal.     /76   Ht 182.9 cm (72\")   Wt 87.5 kg (192 lb 14.4 oz)   BMI 26.16 kg/m²       Visit Vitals  /76   Ht 182.9 cm (72\")   Wt 87.5 kg (192 lb 14.4 oz)   BMI 26.16 kg/m²     Body mass index is 26.16 kg/m².      Assessment/Plan   Diagnoses and all orders for this visit:    1. Essential hypertension (Primary)  -     Comprehensive Metabolic Panel  -     Magnesium    2. Familial hypercholesterolemia  -     Lipid Panel With LDL / HDL Ratio    3. Screening PSA (prostate specific antigen)  -     PSA Screen    4. Benign prostatic hyperplasia with post-void dribbling    Counseled mainly on lifestyle measures COVID flu RSV vaccines in the fall.  Lab work today.  If PSA normal did not short-term trial of Flomax for the dribbling.  Otherwise recheck yearlyAnswers submitted by the patient for this visit:  Primary Reason for Visit (Submitted on 8/30/2023)  What is the primary reason for your visit?: Physical    "